# Patient Record
Sex: FEMALE | Race: WHITE | NOT HISPANIC OR LATINO | Employment: OTHER | URBAN - METROPOLITAN AREA
[De-identification: names, ages, dates, MRNs, and addresses within clinical notes are randomized per-mention and may not be internally consistent; named-entity substitution may affect disease eponyms.]

---

## 2023-05-19 DIAGNOSIS — I10 PRIMARY HYPERTENSION: Primary | ICD-10-CM

## 2023-05-19 RX ORDER — LISINOPRIL 30 MG/1
30 TABLET ORAL DAILY
Qty: 30 TABLET | Refills: 1 | Status: SHIPPED | OUTPATIENT
Start: 2023-05-19 | End: 2023-06-22 | Stop reason: SDUPTHER

## 2023-05-19 RX ORDER — LISINOPRIL 10 MG/1
TABLET ORAL
COMMUNITY
Start: 2020-06-23 | End: 2023-05-19 | Stop reason: SDUPTHER

## 2023-06-21 PROBLEM — M25.50 POLYARTHRALGIA: Status: ACTIVE | Noted: 2023-06-21

## 2023-06-21 PROBLEM — M62.830 BACK MUSCLE SPASM: Status: ACTIVE | Noted: 2023-06-21

## 2023-06-21 PROBLEM — M19.012 OSTEOARTHRITIS OF LEFT GLENOHUMERAL JOINT: Status: ACTIVE | Noted: 2023-06-21

## 2023-06-21 PROBLEM — B35.0 TINEA CAPITIS: Status: ACTIVE | Noted: 2023-06-21

## 2023-06-21 PROBLEM — G89.29 CHRONIC PAIN: Status: ACTIVE | Noted: 2023-06-21

## 2023-06-21 PROBLEM — M54.50 LUMBAGO: Status: ACTIVE | Noted: 2023-06-21

## 2023-06-21 PROBLEM — M79.18 MYOFASCIAL PAIN: Status: ACTIVE | Noted: 2023-06-21

## 2023-06-21 PROBLEM — K21.9 GERD (GASTROESOPHAGEAL REFLUX DISEASE): Status: ACTIVE | Noted: 2023-06-21

## 2023-06-21 PROBLEM — R73.9 HYPERGLYCEMIA: Status: ACTIVE | Noted: 2023-06-21

## 2023-06-21 PROBLEM — R19.7 DIARRHEA: Status: RESOLVED | Noted: 2023-06-21 | Resolved: 2023-06-21

## 2023-06-21 PROBLEM — E78.5 HYPERLIPIDEMIA: Status: ACTIVE | Noted: 2023-06-21

## 2023-06-21 PROBLEM — L25.9 CONTACT DERMATITIS: Status: RESOLVED | Noted: 2023-06-21 | Resolved: 2023-06-21

## 2023-06-21 PROBLEM — J44.9 CHRONIC OBSTRUCTIVE PULMONARY DISEASE (MULTI): Status: ACTIVE | Noted: 2023-06-21

## 2023-06-21 PROBLEM — F41.8 ANXIETY ASSOCIATED WITH DEPRESSION: Status: ACTIVE | Noted: 2023-06-21

## 2023-06-21 PROBLEM — F43.21 GRIEF: Status: RESOLVED | Noted: 2023-06-21 | Resolved: 2023-06-21

## 2023-06-21 PROBLEM — F31.9 BIPOLAR DEPRESSION (MULTI): Status: ACTIVE | Noted: 2023-06-21

## 2023-06-21 PROBLEM — E53.8 VITAMIN B12 DEFICIENCY: Status: ACTIVE | Noted: 2023-06-21

## 2023-06-21 PROBLEM — I10 ESSENTIAL HYPERTENSION: Status: ACTIVE | Noted: 2023-06-21

## 2023-06-21 PROBLEM — M79.7 FIBROMYALGIA: Status: ACTIVE | Noted: 2023-06-21

## 2023-06-21 PROBLEM — E55.9 VITAMIN D DEFICIENCY: Status: ACTIVE | Noted: 2023-06-21

## 2023-06-21 PROBLEM — Q63.9 KIDNEY ANOMALY, CONGENITAL: Status: ACTIVE | Noted: 2023-06-21

## 2023-06-21 RX ORDER — DICYCLOMINE HYDROCHLORIDE 10 MG/1
10 CAPSULE ORAL 4 TIMES DAILY
COMMUNITY
Start: 2022-01-26 | End: 2023-12-21 | Stop reason: ALTCHOICE

## 2023-06-21 RX ORDER — AMOXICILLIN 500 MG
CAPSULE ORAL
COMMUNITY

## 2023-06-21 RX ORDER — MULTIVIT WITH MINERALS/HERBS
1 TABLET ORAL DAILY
COMMUNITY

## 2023-06-21 RX ORDER — ESCITALOPRAM OXALATE 20 MG/1
20 TABLET ORAL DAILY
COMMUNITY

## 2023-06-21 RX ORDER — HYDROXYZINE HYDROCHLORIDE 50 MG/1
50-100 TABLET, FILM COATED ORAL NIGHTLY PRN
COMMUNITY
Start: 2023-04-26

## 2023-06-21 RX ORDER — CALCIUM CARB/VITAMIN D3/VIT K1 500-500-40
1 TABLET,CHEWABLE ORAL DAILY
COMMUNITY

## 2023-06-21 RX ORDER — KETOCONAZOLE 20 MG/ML
SHAMPOO, SUSPENSION TOPICAL
COMMUNITY

## 2023-06-21 RX ORDER — LOPERAMIDE HYDROCHLORIDE 2 MG/1
2 CAPSULE ORAL 4 TIMES DAILY PRN
COMMUNITY
Start: 2022-01-26

## 2023-06-21 RX ORDER — CLOBETASOL PROPIONATE 0.5 MG/G
CREAM TOPICAL
COMMUNITY

## 2023-06-21 RX ORDER — ACETAMINOPHEN 500 MG
1 TABLET ORAL 2 TIMES DAILY
COMMUNITY

## 2023-06-21 RX ORDER — MULTIVITAMIN
1 TABLET ORAL DAILY
COMMUNITY

## 2023-06-21 RX ORDER — FERROUS SULFATE 325(65) MG
65 TABLET ORAL
COMMUNITY

## 2023-06-21 RX ORDER — ATORVASTATIN CALCIUM 40 MG/1
1 TABLET, FILM COATED ORAL NIGHTLY
COMMUNITY
End: 2023-06-22 | Stop reason: SDUPTHER

## 2023-06-21 RX ORDER — OMEPRAZOLE 20 MG/1
20 CAPSULE, DELAYED RELEASE ORAL NIGHTLY
COMMUNITY
End: 2023-06-22 | Stop reason: SDUPTHER

## 2023-06-22 ENCOUNTER — OFFICE VISIT (OUTPATIENT)
Dept: PRIMARY CARE | Facility: CLINIC | Age: 68
End: 2023-06-22
Payer: COMMERCIAL

## 2023-06-22 VITALS
OXYGEN SATURATION: 97 % | BODY MASS INDEX: 29.63 KG/M2 | HEIGHT: 62 IN | WEIGHT: 161 LBS | HEART RATE: 68 BPM | RESPIRATION RATE: 16 BRPM | SYSTOLIC BLOOD PRESSURE: 118 MMHG | DIASTOLIC BLOOD PRESSURE: 68 MMHG

## 2023-06-22 DIAGNOSIS — M54.50 LUMBAR PAIN: ICD-10-CM

## 2023-06-22 DIAGNOSIS — E55.9 VITAMIN D DEFICIENCY: ICD-10-CM

## 2023-06-22 DIAGNOSIS — Z12.31 ENCOUNTER FOR SCREENING MAMMOGRAM FOR MALIGNANT NEOPLASM OF BREAST: ICD-10-CM

## 2023-06-22 DIAGNOSIS — E78.2 MIXED HYPERLIPIDEMIA: Primary | ICD-10-CM

## 2023-06-22 DIAGNOSIS — R73.01 ELEVATED FASTING BLOOD SUGAR: ICD-10-CM

## 2023-06-22 DIAGNOSIS — I10 PRIMARY HYPERTENSION: ICD-10-CM

## 2023-06-22 DIAGNOSIS — K21.9 GASTROESOPHAGEAL REFLUX DISEASE WITHOUT ESOPHAGITIS: ICD-10-CM

## 2023-06-22 DIAGNOSIS — E53.8 VITAMIN B12 DEFICIENCY: ICD-10-CM

## 2023-06-22 DIAGNOSIS — Z77.22 SECOND HAND SMOKE EXPOSURE: ICD-10-CM

## 2023-06-22 DIAGNOSIS — M54.2 NECK PAIN: ICD-10-CM

## 2023-06-22 DIAGNOSIS — Z13.29 SCREENING FOR HYPOTHYROIDISM: ICD-10-CM

## 2023-06-22 PROCEDURE — 3078F DIAST BP <80 MM HG: CPT

## 2023-06-22 PROCEDURE — 1036F TOBACCO NON-USER: CPT

## 2023-06-22 PROCEDURE — 99214 OFFICE O/P EST MOD 30 MIN: CPT

## 2023-06-22 PROCEDURE — 1159F MED LIST DOCD IN RCRD: CPT

## 2023-06-22 PROCEDURE — 3074F SYST BP LT 130 MM HG: CPT

## 2023-06-22 PROCEDURE — 1160F RVW MEDS BY RX/DR IN RCRD: CPT

## 2023-06-22 RX ORDER — ATORVASTATIN CALCIUM 40 MG/1
40 TABLET, FILM COATED ORAL NIGHTLY
Qty: 90 TABLET | Refills: 2 | Status: SHIPPED | OUTPATIENT
Start: 2023-06-22 | End: 2023-11-14 | Stop reason: SDUPTHER

## 2023-06-22 RX ORDER — LISINOPRIL 30 MG/1
30 TABLET ORAL DAILY
Qty: 30 TABLET | Refills: 1 | Status: SHIPPED | OUTPATIENT
Start: 2023-06-22 | End: 2023-11-14 | Stop reason: SDUPTHER

## 2023-06-22 RX ORDER — OMEPRAZOLE 20 MG/1
20 CAPSULE, DELAYED RELEASE ORAL NIGHTLY
Qty: 90 CAPSULE | Refills: 2 | Status: SHIPPED | OUTPATIENT
Start: 2023-06-22 | End: 2023-11-14 | Stop reason: SDUPTHER

## 2023-06-22 RX ORDER — CYCLOBENZAPRINE HCL 10 MG
10 TABLET ORAL NIGHTLY
Qty: 30 TABLET | Refills: 0 | Status: SHIPPED | OUTPATIENT
Start: 2023-06-22 | End: 2023-08-04

## 2023-06-22 ASSESSMENT — ENCOUNTER SYMPTOMS
GASTROINTESTINAL NEGATIVE: 1
ENDOCRINE NEGATIVE: 1
LOSS OF SENSATION IN FEET: 0
CARDIOVASCULAR NEGATIVE: 1
NEUROLOGICAL NEGATIVE: 1
OCCASIONAL FEELINGS OF UNSTEADINESS: 0
HEMATOLOGIC/LYMPHATIC NEGATIVE: 1
RESPIRATORY NEGATIVE: 1
EYES NEGATIVE: 1
DEPRESSION: 0
PSYCHIATRIC NEGATIVE: 1
CONSTITUTIONAL NEGATIVE: 1
ALLERGIC/IMMUNOLOGIC NEGATIVE: 1
MUSCULOSKELETAL NEGATIVE: 1

## 2023-06-22 ASSESSMENT — ANXIETY QUESTIONNAIRES
3. WORRYING TOO MUCH ABOUT DIFFERENT THINGS: NOT AT ALL
GAD7 TOTAL SCORE: 0
5. BEING SO RESTLESS THAT IT IS HARD TO SIT STILL: NOT AT ALL
7. FEELING AFRAID AS IF SOMETHING AWFUL MIGHT HAPPEN: NOT AT ALL
4. TROUBLE RELAXING: NOT AT ALL
1. FEELING NERVOUS, ANXIOUS, OR ON EDGE: NOT AT ALL
IF YOU CHECKED OFF ANY PROBLEMS ON THIS QUESTIONNAIRE, HOW DIFFICULT HAVE THESE PROBLEMS MADE IT FOR YOU TO DO YOUR WORK, TAKE CARE OF THINGS AT HOME, OR GET ALONG WITH OTHER PEOPLE: NOT DIFFICULT AT ALL
6. BECOMING EASILY ANNOYED OR IRRITABLE: NOT AT ALL
2. NOT BEING ABLE TO STOP OR CONTROL WORRYING: NOT AT ALL

## 2023-06-22 ASSESSMENT — PATIENT HEALTH QUESTIONNAIRE - PHQ9
1. LITTLE INTEREST OR PLEASURE IN DOING THINGS: NOT AT ALL
2. FEELING DOWN, DEPRESSED OR HOPELESS: NOT AT ALL
SUM OF ALL RESPONSES TO PHQ9 QUESTIONS 1 AND 2: 0

## 2023-06-22 NOTE — PROGRESS NOTES
"Subjective   Patient ID: Renu Patel is a 67 y.o. female who presents for Follow-up.    HPI a 67-year-old female with past medical history of hyperlipidemia, anxiety and depression, hypertension, GERD, vitamin D deficiency, chronic neck and lumbar sacral pain, IBS, iron deficiency, vitamin deficiency arrives to clinic with chief complaint of 6-month follow-up.  At her last appointment, she was given directions to complete a CT cardiac scoring.  She was not given any orders for blood work because her blood work at her last appointment was unremarkable.  She reports no recent illnesses and has been relatively healthy.  She does report that her chronic neck and lower back pain has worsened and is wondering if there are any interventions for this.  She denies any falls or trauma.  She is here for further evaluation and health maintenance.    Review of Systems   Constitutional: Negative.    HENT: Negative.     Eyes: Negative.    Respiratory: Negative.     Cardiovascular: Negative.    Gastrointestinal: Negative.    Endocrine: Negative.    Genitourinary: Negative.    Musculoskeletal: Negative.    Skin: Negative.    Allergic/Immunologic: Negative.    Neurological: Negative.    Hematological: Negative.    Psychiatric/Behavioral: Negative.     All other systems reviewed and are negative.      Objective   /68   Pulse 68   Resp 16   Ht 1.575 m (5' 2\")   Wt 73 kg (161 lb)   SpO2 97%   BMI 29.45 kg/m²     Physical Exam  Vitals and nursing note reviewed.   Constitutional:       Appearance: Normal appearance.   HENT:      Head: Normocephalic and atraumatic.      Right Ear: Tympanic membrane normal.      Left Ear: Tympanic membrane normal.      Nose: Nose normal.      Mouth/Throat:      Mouth: Mucous membranes are moist.      Pharynx: Oropharynx is clear.   Eyes:      Extraocular Movements: Extraocular movements intact.      Conjunctiva/sclera: Conjunctivae normal.      Pupils: Pupils are equal, round, and reactive " to light.   Cardiovascular:      Rate and Rhythm: Normal rate and regular rhythm.   Pulmonary:      Effort: Pulmonary effort is normal.      Breath sounds: Normal breath sounds.   Abdominal:      General: Bowel sounds are normal.      Palpations: Abdomen is soft.   Musculoskeletal:         General: Normal range of motion.      Cervical back: Normal range of motion and neck supple.   Skin:     General: Skin is warm.      Capillary Refill: Capillary refill takes less than 2 seconds.   Neurological:      General: No focal deficit present.      Mental Status: She is alert and oriented to person, place, and time. Mental status is at baseline.   Psychiatric:         Mood and Affect: Mood normal.         Behavior: Behavior normal.         Thought Content: Thought content normal.         Judgment: Judgment normal.         Assessment/Plan   Problem List Items Addressed This Visit       GERD (gastroesophageal reflux disease)    Relevant Medications    omeprazole (PriLOSEC) 20 mg DR capsule    Other Relevant Orders    Comprehensive Metabolic Panel    Hyperlipidemia - Primary    Relevant Medications    atorvastatin (Lipitor) 40 mg tablet    Other Relevant Orders    Lipid Panel    Vitamin B12 deficiency    Relevant Orders    CBC    Vitamin B12    Vitamin D deficiency    Relevant Orders    Vitamin D, Total     Other Visit Diagnoses       Primary hypertension        Relevant Medications    lisinopril 30 mg tablet    Screening for hypothyroidism        Relevant Orders    TSH with reflex to Free T4 if abnormal    Elevated fasting blood sugar        Relevant Orders    Hemoglobin A1C    Encounter for screening mammogram for malignant neoplasm of breast        Relevant Orders    BI mammo bilateral screening tomosynthesis    Lumbar pain        Relevant Medications    cyclobenzaprine (Flexeril) 10 mg tablet    Other Relevant Orders    XR lumbar spine 2-3 views    Neck pain        Relevant Orders    XR cervical spine 2-3 views    Second  hand smoke exposure        Relevant Orders    XR chest 2 views          It was a pleasure seeing you in the office today.    I have ordered blood work for you to complete prior to your next appointment.  These labs require you to fast.  Your next appointment will be a 6-month Medicare wellness.    You are up-to-date on all screening exams and vaccinations other than your mammogram.  I have placed a order for a mammogram for you to complete before your next appointment.    Please continue all of your medications as prescribed as they are working to control your symptoms.    In regards to your neck pain, I have ordered an x-ray of your neck.  I will call you with any osseous abnormalities.  I have also ordered an x-ray of your lumbar sacral spine to rule out any osseous abnormalities.  I have also ordered Flexeril 10 mg oral tablet nightly for your back pain.    You request this x-ray of your chest due to secondhand smoke exposure.  I have also placed this as well.    Please do not hesitate to call the office for any future questions or concerns.    I also advised you to follow low fat diet and exercise for at least 30 minutes daily.    Anticipatory guidance, age appropriate vaccines, screening exams, health promotion and prevention discussed.    This document was generated using the assistance of voice recognition software. If there are any errors of spelling, grammar, syntax, or meaning; please feel free to contact me directly for clarification.

## 2023-07-10 ENCOUNTER — LAB (OUTPATIENT)
Dept: LAB | Facility: LAB | Age: 68
End: 2023-07-10
Payer: COMMERCIAL

## 2023-07-10 DIAGNOSIS — R73.01 ELEVATED FASTING BLOOD SUGAR: ICD-10-CM

## 2023-07-10 DIAGNOSIS — K21.9 GASTROESOPHAGEAL REFLUX DISEASE WITHOUT ESOPHAGITIS: ICD-10-CM

## 2023-07-10 DIAGNOSIS — E78.2 MIXED HYPERLIPIDEMIA: ICD-10-CM

## 2023-07-10 DIAGNOSIS — E53.8 VITAMIN B12 DEFICIENCY: ICD-10-CM

## 2023-07-10 DIAGNOSIS — E55.9 VITAMIN D DEFICIENCY: ICD-10-CM

## 2023-07-10 DIAGNOSIS — Z13.29 SCREENING FOR HYPOTHYROIDISM: ICD-10-CM

## 2023-07-10 LAB
ALANINE AMINOTRANSFERASE (SGPT) (U/L) IN SER/PLAS: 13 U/L (ref 7–45)
ALBUMIN (G/DL) IN SER/PLAS: 4.1 G/DL (ref 3.4–5)
ALKALINE PHOSPHATASE (U/L) IN SER/PLAS: 89 U/L (ref 33–136)
ANION GAP IN SER/PLAS: 16 MMOL/L (ref 10–20)
ASPARTATE AMINOTRANSFERASE (SGOT) (U/L) IN SER/PLAS: 17 U/L (ref 9–39)
BILIRUBIN TOTAL (MG/DL) IN SER/PLAS: 0.6 MG/DL (ref 0–1.2)
CALCIDIOL (25 OH VITAMIN D3) (NG/ML) IN SER/PLAS: 50 NG/ML
CALCIUM (MG/DL) IN SER/PLAS: 8.9 MG/DL (ref 8.6–10.3)
CARBON DIOXIDE, TOTAL (MMOL/L) IN SER/PLAS: 22 MMOL/L (ref 21–32)
CHLORIDE (MMOL/L) IN SER/PLAS: 105 MMOL/L (ref 98–107)
CHOLESTEROL (MG/DL) IN SER/PLAS: 165 MG/DL (ref 0–199)
CHOLESTEROL IN HDL (MG/DL) IN SER/PLAS: 44.2 MG/DL
CHOLESTEROL/HDL RATIO: 3.7
COBALAMIN (VITAMIN B12) (PG/ML) IN SER/PLAS: 1097 PG/ML (ref 211–911)
CREATININE (MG/DL) IN SER/PLAS: 0.81 MG/DL (ref 0.5–1.05)
ERYTHROCYTE DISTRIBUTION WIDTH (RATIO) BY AUTOMATED COUNT: 12.7 % (ref 11.5–14.5)
ERYTHROCYTE MEAN CORPUSCULAR HEMOGLOBIN CONCENTRATION (G/DL) BY AUTOMATED: 33.6 G/DL (ref 32–36)
ERYTHROCYTE MEAN CORPUSCULAR VOLUME (FL) BY AUTOMATED COUNT: 89 FL (ref 80–100)
ERYTHROCYTES (10*6/UL) IN BLOOD BY AUTOMATED COUNT: 4.56 X10E12/L (ref 4–5.2)
ESTIMATED AVERAGE GLUCOSE FOR HBA1C: 117 MG/DL
GFR FEMALE: 80 ML/MIN/1.73M2
GLUCOSE (MG/DL) IN SER/PLAS: 80 MG/DL (ref 74–99)
HEMATOCRIT (%) IN BLOOD BY AUTOMATED COUNT: 40.5 % (ref 36–46)
HEMOGLOBIN (G/DL) IN BLOOD: 13.6 G/DL (ref 12–16)
HEMOGLOBIN A1C/HEMOGLOBIN TOTAL IN BLOOD: 5.7 %
LDL: 90 MG/DL (ref 0–99)
LEUKOCYTES (10*3/UL) IN BLOOD BY AUTOMATED COUNT: 6.3 X10E9/L (ref 4.4–11.3)
PLATELETS (10*3/UL) IN BLOOD AUTOMATED COUNT: 195 X10E9/L (ref 150–450)
POTASSIUM (MMOL/L) IN SER/PLAS: 4.1 MMOL/L (ref 3.5–5.3)
PROTEIN TOTAL: 6.1 G/DL (ref 6.4–8.2)
SODIUM (MMOL/L) IN SER/PLAS: 139 MMOL/L (ref 136–145)
THYROTROPIN (MIU/L) IN SER/PLAS BY DETECTION LIMIT <= 0.05 MIU/L: 1.71 MIU/L (ref 0.44–3.98)
TRIGLYCERIDE (MG/DL) IN SER/PLAS: 155 MG/DL (ref 0–149)
UREA NITROGEN (MG/DL) IN SER/PLAS: 12 MG/DL (ref 6–23)
VLDL: 31 MG/DL (ref 0–40)

## 2023-07-10 PROCEDURE — 84443 ASSAY THYROID STIM HORMONE: CPT

## 2023-07-10 PROCEDURE — 80061 LIPID PANEL: CPT

## 2023-07-10 PROCEDURE — 80053 COMPREHEN METABOLIC PANEL: CPT

## 2023-07-10 PROCEDURE — 82607 VITAMIN B-12: CPT

## 2023-07-10 PROCEDURE — 85027 COMPLETE CBC AUTOMATED: CPT

## 2023-07-10 PROCEDURE — 83036 HEMOGLOBIN GLYCOSYLATED A1C: CPT

## 2023-07-10 PROCEDURE — 36415 COLL VENOUS BLD VENIPUNCTURE: CPT

## 2023-07-10 PROCEDURE — 82306 VITAMIN D 25 HYDROXY: CPT

## 2023-08-04 DIAGNOSIS — M54.50 LUMBAR PAIN: ICD-10-CM

## 2023-08-04 RX ORDER — CYCLOBENZAPRINE HCL 10 MG
TABLET ORAL
Qty: 30 TABLET | Refills: 0 | Status: SHIPPED | OUTPATIENT
Start: 2023-08-04 | End: 2023-11-14 | Stop reason: SDUPTHER

## 2023-11-14 ENCOUNTER — TELEPHONE (OUTPATIENT)
Dept: PRIMARY CARE | Facility: CLINIC | Age: 68
End: 2023-11-14
Payer: COMMERCIAL

## 2023-11-14 DIAGNOSIS — K21.9 GASTROESOPHAGEAL REFLUX DISEASE WITHOUT ESOPHAGITIS: ICD-10-CM

## 2023-11-14 DIAGNOSIS — M54.50 LUMBAR PAIN: ICD-10-CM

## 2023-11-14 DIAGNOSIS — I10 PRIMARY HYPERTENSION: ICD-10-CM

## 2023-11-14 DIAGNOSIS — E78.2 MIXED HYPERLIPIDEMIA: ICD-10-CM

## 2023-11-14 RX ORDER — CYCLOBENZAPRINE HCL 10 MG
10 TABLET ORAL DAILY
Qty: 30 TABLET | Refills: 0 | Status: SHIPPED | OUTPATIENT
Start: 2023-11-14 | End: 2023-12-18 | Stop reason: SDUPTHER

## 2023-11-14 RX ORDER — ATORVASTATIN CALCIUM 40 MG/1
40 TABLET, FILM COATED ORAL NIGHTLY
Qty: 30 TABLET | Refills: 0 | Status: SHIPPED | OUTPATIENT
Start: 2023-11-14 | End: 2023-12-18 | Stop reason: SDUPTHER

## 2023-11-14 RX ORDER — LISINOPRIL 30 MG/1
30 TABLET ORAL DAILY
Qty: 30 TABLET | Refills: 0 | Status: SHIPPED | OUTPATIENT
Start: 2023-11-14 | End: 2023-12-18 | Stop reason: SDUPTHER

## 2023-11-14 RX ORDER — OMEPRAZOLE 20 MG/1
20 CAPSULE, DELAYED RELEASE ORAL NIGHTLY
Qty: 30 CAPSULE | Refills: 0 | Status: SHIPPED | OUTPATIENT
Start: 2023-11-14 | End: 2023-12-18 | Stop reason: SDUPTHER

## 2023-11-14 NOTE — TELEPHONE ENCOUNTER
Refill on Atorvastatin 40 mg , Omeprazole 20 mg , Lisinopril 30 mg    & Cyclobenzaprine 10 mg    To Theron Markham

## 2023-12-15 ENCOUNTER — TELEPHONE (OUTPATIENT)
Dept: PRIMARY CARE | Facility: CLINIC | Age: 68
End: 2023-12-15
Payer: COMMERCIAL

## 2023-12-15 DIAGNOSIS — I10 PRIMARY HYPERTENSION: ICD-10-CM

## 2023-12-15 DIAGNOSIS — K21.9 GASTROESOPHAGEAL REFLUX DISEASE WITHOUT ESOPHAGITIS: ICD-10-CM

## 2023-12-15 DIAGNOSIS — E78.2 MIXED HYPERLIPIDEMIA: ICD-10-CM

## 2023-12-15 DIAGNOSIS — M54.50 LUMBAR PAIN: ICD-10-CM

## 2023-12-15 NOTE — TELEPHONE ENCOUNTER
Atorvastatin  Lisinopril   Cyclobenzaprine   Omeprazole   [facility] [place]   [employer]   LOV 6/22/2023  NOV 12/21/23

## 2023-12-18 RX ORDER — OMEPRAZOLE 20 MG/1
20 CAPSULE, DELAYED RELEASE ORAL NIGHTLY
Qty: 90 CAPSULE | Refills: 1 | Status: SHIPPED | OUTPATIENT
Start: 2023-12-18 | End: 2024-06-15

## 2023-12-18 RX ORDER — LISINOPRIL 30 MG/1
30 TABLET ORAL DAILY
Qty: 90 TABLET | Refills: 1 | Status: SHIPPED | OUTPATIENT
Start: 2023-12-18 | End: 2024-06-15

## 2023-12-18 RX ORDER — ATORVASTATIN CALCIUM 40 MG/1
40 TABLET, FILM COATED ORAL NIGHTLY
Qty: 90 TABLET | Refills: 1 | Status: SHIPPED | OUTPATIENT
Start: 2023-12-18 | End: 2024-06-15

## 2023-12-18 RX ORDER — CYCLOBENZAPRINE HCL 10 MG
10 TABLET ORAL DAILY
Qty: 90 TABLET | Refills: 0 | Status: SHIPPED | OUTPATIENT
Start: 2023-12-18 | End: 2024-03-17

## 2023-12-21 ENCOUNTER — OFFICE VISIT (OUTPATIENT)
Dept: PRIMARY CARE | Facility: CLINIC | Age: 68
End: 2023-12-21
Payer: COMMERCIAL

## 2023-12-21 VITALS
HEART RATE: 84 BPM | SYSTOLIC BLOOD PRESSURE: 130 MMHG | WEIGHT: 166 LBS | DIASTOLIC BLOOD PRESSURE: 84 MMHG | HEIGHT: 62 IN | BODY MASS INDEX: 30.55 KG/M2

## 2023-12-21 DIAGNOSIS — R73.03 PREDIABETES: ICD-10-CM

## 2023-12-21 DIAGNOSIS — E55.9 VITAMIN D DEFICIENCY: ICD-10-CM

## 2023-12-21 DIAGNOSIS — E53.8 VITAMIN B12 DEFICIENCY: ICD-10-CM

## 2023-12-21 DIAGNOSIS — F31.9 BIPOLAR DEPRESSION (MULTI): ICD-10-CM

## 2023-12-21 DIAGNOSIS — Z12.11 COLON CANCER SCREENING: ICD-10-CM

## 2023-12-21 DIAGNOSIS — Z12.31 VISIT FOR SCREENING MAMMOGRAM: ICD-10-CM

## 2023-12-21 DIAGNOSIS — J44.9 CHRONIC OBSTRUCTIVE PULMONARY DISEASE, UNSPECIFIED COPD TYPE (MULTI): ICD-10-CM

## 2023-12-21 DIAGNOSIS — R73.9 HYPERGLYCEMIA: ICD-10-CM

## 2023-12-21 DIAGNOSIS — I10 ESSENTIAL HYPERTENSION: ICD-10-CM

## 2023-12-21 DIAGNOSIS — E78.2 MIXED HYPERLIPIDEMIA: ICD-10-CM

## 2023-12-21 DIAGNOSIS — Z00.00 MEDICARE ANNUAL WELLNESS VISIT, SUBSEQUENT: Primary | ICD-10-CM

## 2023-12-21 PROCEDURE — G0444 DEPRESSION SCREEN ANNUAL: HCPCS | Performed by: CLINICAL NURSE SPECIALIST

## 2023-12-21 PROCEDURE — 3075F SYST BP GE 130 - 139MM HG: CPT | Performed by: CLINICAL NURSE SPECIALIST

## 2023-12-21 PROCEDURE — G0439 PPPS, SUBSEQ VISIT: HCPCS | Performed by: CLINICAL NURSE SPECIALIST

## 2023-12-21 PROCEDURE — 3079F DIAST BP 80-89 MM HG: CPT | Performed by: CLINICAL NURSE SPECIALIST

## 2023-12-21 PROCEDURE — 1170F FXNL STATUS ASSESSED: CPT | Performed by: CLINICAL NURSE SPECIALIST

## 2023-12-21 PROCEDURE — 99214 OFFICE O/P EST MOD 30 MIN: CPT | Performed by: CLINICAL NURSE SPECIALIST

## 2023-12-21 PROCEDURE — G0009 ADMIN PNEUMOCOCCAL VACCINE: HCPCS | Performed by: CLINICAL NURSE SPECIALIST

## 2023-12-21 PROCEDURE — 90686 IIV4 VACC NO PRSV 0.5 ML IM: CPT | Performed by: CLINICAL NURSE SPECIALIST

## 2023-12-21 PROCEDURE — 1036F TOBACCO NON-USER: CPT | Performed by: CLINICAL NURSE SPECIALIST

## 2023-12-21 PROCEDURE — 90677 PCV20 VACCINE IM: CPT | Performed by: CLINICAL NURSE SPECIALIST

## 2023-12-21 PROCEDURE — G0008 ADMIN INFLUENZA VIRUS VAC: HCPCS | Performed by: CLINICAL NURSE SPECIALIST

## 2023-12-21 PROCEDURE — 1160F RVW MEDS BY RX/DR IN RCRD: CPT | Performed by: CLINICAL NURSE SPECIALIST

## 2023-12-21 PROCEDURE — 1159F MED LIST DOCD IN RCRD: CPT | Performed by: CLINICAL NURSE SPECIALIST

## 2023-12-21 SDOH — ECONOMIC STABILITY: FOOD INSECURITY: WITHIN THE PAST 12 MONTHS, THE FOOD YOU BOUGHT JUST DIDN'T LAST AND YOU DIDN'T HAVE MONEY TO GET MORE.: SOMETIMES TRUE

## 2023-12-21 SDOH — ECONOMIC STABILITY: FOOD INSECURITY: WITHIN THE PAST 12 MONTHS, YOU WORRIED THAT YOUR FOOD WOULD RUN OUT BEFORE YOU GOT MONEY TO BUY MORE.: SOMETIMES TRUE

## 2023-12-21 ASSESSMENT — COLUMBIA-SUICIDE SEVERITY RATING SCALE - C-SSRS
6. HAVE YOU EVER DONE ANYTHING, STARTED TO DO ANYTHING, OR PREPARED TO DO ANYTHING TO END YOUR LIFE?: NO
2. HAVE YOU ACTUALLY HAD ANY THOUGHTS OF KILLING YOURSELF?: NO
1. IN THE PAST MONTH, HAVE YOU WISHED YOU WERE DEAD OR WISHED YOU COULD GO TO SLEEP AND NOT WAKE UP?: NO

## 2023-12-21 ASSESSMENT — ENCOUNTER SYMPTOMS
MYALGIAS: 0
APPETITE CHANGE: 0
PALPITATIONS: 0
UNEXPECTED WEIGHT CHANGE: 0
POLYDIPSIA: 0
LOSS OF SENSATION IN FEET: 0
FATIGUE: 0
DIZZINESS: 0
SEIZURES: 0
BRUISES/BLEEDS EASILY: 0
PHOTOPHOBIA: 0
NAUSEA: 0
DYSURIA: 0
SLEEP DISTURBANCE: 0
CHILLS: 0
BACK PAIN: 0
SORE THROAT: 0
DIARRHEA: 0
ACTIVITY CHANGE: 0
FLANK PAIN: 0
DEPRESSION: 0
VOMITING: 0
CHEST TIGHTNESS: 0
OCCASIONAL FEELINGS OF UNSTEADINESS: 0
ABDOMINAL PAIN: 0
BLOOD IN STOOL: 0
WHEEZING: 0
CONSTIPATION: 0
CONFUSION: 0
COUGH: 0
ARTHRALGIAS: 0
FEVER: 0
WOUND: 0
SHORTNESS OF BREATH: 0
HEADACHES: 0
JOINT SWELLING: 0
TROUBLE SWALLOWING: 0
NECK PAIN: 0
HEMATURIA: 0
EYE PAIN: 0

## 2023-12-21 ASSESSMENT — ACTIVITIES OF DAILY LIVING (ADL)
MANAGING_FINANCES: INDEPENDENT
BATHING: INDEPENDENT
BATHING: INDEPENDENT
GROCERY_SHOPPING: INDEPENDENT
TAKING_MEDICATION: INDEPENDENT
DOING_HOUSEWORK: INDEPENDENT
DRESSING: INDEPENDENT
DRESSING: INDEPENDENT

## 2023-12-21 NOTE — PROGRESS NOTES
Subjective   Reason for Visit: Renu Patel is an 68 y.o. female here for a Medicare Wellness visit.     Past Medical, Surgical, and Family History reviewed and updated in chart.    Reviewed all medications by prescribing practitioner or clinical pharmacist (such as prescriptions, OTCs, herbal therapies and supplements) and documented in the medical record.    HPI    New patient here today to establish care.  Transfer care from [facility] for Medicare Wellness.     Follows with Dr. Marion White. Has been on Lexapro. Bipolar Depression and PTSD. Hydroxyzine PRN.     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 Previously followed with Dr. Cochran.     Has continued medications as prescribed.         Patient Care Team:  [facility] as PCP - General (Internal Medicine)  Fei Almonte MD as PCP - [facility] PCP  Stephen Schultz MD (Psychiatry)     Review of Systems   Constitutional:  Negative for activity change, appetite change, chills, fatigue, fever and unexpected weight change.   HENT:  Negative for ear pain, hearing loss, nosebleeds, sore throat, tinnitus and trouble swallowing.    Eyes:  Negative for photophobia, pain and visual disturbance.   Respiratory:  Negative for cough, chest tightness, shortness of breath and wheezing.    Cardiovascular:  Negative for chest pain, palpitations and leg swelling.   Gastrointestinal:  Negative for abdominal pain, blood in stool, constipation, diarrhea, nausea and vomiting.   Endocrine: Negative for cold intolerance, heat intolerance, polydipsia and polyuria.   Genitourinary:  Negative for dysuria, flank pain and hematuria.   Musculoskeletal:  Negative for arthralgias, back pain, joint swelling, myalgias and neck pain.   Skin:  Negative for pallor, rash and wound.   Allergic/Immunologic: Negative for immunocompromised state.   Neurological:  Negative for dizziness, seizures and headaches.   Hematological:  Does not bruise/bleed easily.  "  Psychiatric/Behavioral:  Negative for confusion and sleep disturbance.        Objective   Vitals:  /84   Pulse 84   Ht 1.575 m (5' 2\")   Wt 75.3 kg (166 lb)   BMI 30.36 kg/m²       Physical Exam  Vitals and nursing note reviewed.   Constitutional:       General: She is not in acute distress.     Appearance: Normal appearance.   HENT:      Head: Normocephalic.      Nose: Nose normal.   Eyes:      Conjunctiva/sclera: Conjunctivae normal.   Neck:      Vascular: No carotid bruit.   Cardiovascular:      Rate and Rhythm: Normal rate and regular rhythm.      Pulses: Normal pulses.      Heart sounds: Normal heart sounds.   Pulmonary:      Effort: Pulmonary effort is normal.      Breath sounds: Normal breath sounds.   Abdominal:      General: Bowel sounds are normal.      Palpations: Abdomen is soft.   Musculoskeletal:         General: Normal range of motion.      Cervical back: Normal range of motion.   Skin:     General: Skin is warm and dry.   Neurological:      Mental Status: She is alert and oriented to person, place, and time. Mental status is at baseline.   Psychiatric:         Mood and Affect: Mood normal.         Behavior: Behavior normal.       Assessment/Plan   Problem List Items Addressed This Visit    None    New order for blood work provided at  today.     Medicare Wellness: Routine and age appropriate recommendations discussed with the patient today and patient verbalized understanding of the recommendations.  Questions answered.  Age appropriate immunizations and preventative screenings discussed with the patient and ordered as appropriate. Labs updated and ordered as indicated. Recommend healthy diet and daily exercise to maintain healthy body weight.   Bipolar Depression, PTSD: Following with [facility] for management.   COPD: Respiratory status at baseline. Declines any need for Inhalers.   DDD: Previously followed with Chiropractor for management. Water Therapy.   Prediabetes: A1C 5.7%. " Lifestyle changes recommended: Diet consisting of low fat foods, lean meats, high fiber, fresh fruits and vegetables. 150 min/ weekly aerobic exercise.   Hyperlipidemia: Continue Atorvastatin. CT Cardiac Scoring December 2022, low risk. [facility] referral provided.   Hypertension: Continue Lisinopril. Blood pressure borderline at OV today. Encourage ambulatory blood pressure monitoring.   Vitamin D Deficiency: Vitamin D. Reevaluate with next lab work.   Vitamin B12 Deficiency: Vitamin B12. Reevaluate with next lab work.     Medicare Wellness: December 2023.   Bone Density: October 2022, Osteopenia.   Mammogram: April 2022. Ordered for 2023.   Colonoscopy ordered.   Pneumovax: July 2021.   Flu Vaccine: December 2023.   Prevnar 20: December 2023.   Declined updated Vaccinations.

## 2023-12-22 ENCOUNTER — APPOINTMENT (OUTPATIENT)
Dept: PRIMARY CARE | Facility: CLINIC | Age: 68
End: 2023-12-22
Payer: COMMERCIAL

## 2024-01-31 ENCOUNTER — TELEPHONE (OUTPATIENT)
Dept: GASTROENTEROLOGY | Facility: CLINIC | Age: 69
End: 2024-01-31
Payer: COMMERCIAL

## 2024-01-31 NOTE — TELEPHONE ENCOUNTER
----- Message from Yumiko Dumont MA sent at 1/31/2024  9:15 AM EST -----  Regarding: RE: colonoscopy screening  KEPT GETTING A BUSY SIGNAL.    LETTER MAILED TO THE PATIENT TO CONTACT THE OFFICE  ----- Message -----  From: Yumiko Dumont MA  Sent: 1/22/2024   1:49 PM EST  To: [id number]  Subject: RE: colonoscopy screening                        KEPT GETTING A BUSY SIGNAL. WILL TRY AGAIN LATER  ----- Message -----  From: Naomi Miller MA  Sent: 1/11/2024  11:10 AM EST  To: [id number]  Subject: colonoscopy screening                            Open access

## 2024-02-26 ENCOUNTER — HOSPITAL ENCOUNTER (EMERGENCY)
Facility: HOSPITAL | Age: 69
Discharge: HOME | End: 2024-02-26
Attending: EMERGENCY MEDICINE
Payer: COMMERCIAL

## 2024-02-26 ENCOUNTER — APPOINTMENT (OUTPATIENT)
Dept: RADIOLOGY | Facility: HOSPITAL | Age: 69
End: 2024-02-26
Payer: COMMERCIAL

## 2024-02-26 VITALS
TEMPERATURE: 98.5 F | RESPIRATION RATE: 18 BRPM | SYSTOLIC BLOOD PRESSURE: 148 MMHG | DIASTOLIC BLOOD PRESSURE: 68 MMHG | HEART RATE: 85 BPM | OXYGEN SATURATION: 98 % | WEIGHT: 160 LBS | HEIGHT: 62 IN | BODY MASS INDEX: 29.44 KG/M2

## 2024-02-26 DIAGNOSIS — L02.91 ABSCESS: Primary | ICD-10-CM

## 2024-02-26 LAB
ANION GAP SERPL CALC-SCNC: 10 MMOL/L (ref 10–20)
BASOPHILS # BLD AUTO: 0.04 X10*3/UL (ref 0–0.1)
BASOPHILS NFR BLD AUTO: 0.6 %
BUN SERPL-MCNC: 8 MG/DL (ref 6–23)
CALCIUM SERPL-MCNC: 9.4 MG/DL (ref 8.6–10.3)
CHLORIDE SERPL-SCNC: 103 MMOL/L (ref 98–107)
CO2 SERPL-SCNC: 27 MMOL/L (ref 21–32)
CREAT SERPL-MCNC: 0.79 MG/DL (ref 0.5–1.05)
EGFRCR SERPLBLD CKD-EPI 2021: 82 ML/MIN/1.73M*2
EOSINOPHIL # BLD AUTO: 0.25 X10*3/UL (ref 0–0.7)
EOSINOPHIL NFR BLD AUTO: 3.9 %
ERYTHROCYTE [DISTWIDTH] IN BLOOD BY AUTOMATED COUNT: 13 % (ref 11.5–14.5)
GLUCOSE SERPL-MCNC: 112 MG/DL (ref 74–99)
HCT VFR BLD AUTO: 37.9 % (ref 36–46)
HGB BLD-MCNC: 12.8 G/DL (ref 12–16)
IMM GRANULOCYTES # BLD AUTO: 0.02 X10*3/UL (ref 0–0.7)
IMM GRANULOCYTES NFR BLD AUTO: 0.3 % (ref 0–0.9)
LACTATE SERPL-SCNC: 1.2 MMOL/L (ref 0.4–2)
LYMPHOCYTES # BLD AUTO: 1.36 X10*3/UL (ref 1.2–4.8)
LYMPHOCYTES NFR BLD AUTO: 21 %
MCH RBC QN AUTO: 29.4 PG (ref 26–34)
MCHC RBC AUTO-ENTMCNC: 33.8 G/DL (ref 32–36)
MCV RBC AUTO: 87 FL (ref 80–100)
MONOCYTES # BLD AUTO: 0.48 X10*3/UL (ref 0.1–1)
MONOCYTES NFR BLD AUTO: 7.4 %
NEUTROPHILS # BLD AUTO: 4.34 X10*3/UL (ref 1.2–7.7)
NEUTROPHILS NFR BLD AUTO: 66.8 %
NRBC BLD-RTO: 0 /100 WBCS (ref 0–0)
PLATELET # BLD AUTO: 202 X10*3/UL (ref 150–450)
POTASSIUM SERPL-SCNC: 3.5 MMOL/L (ref 3.5–5.3)
RBC # BLD AUTO: 4.36 X10*6/UL (ref 4–5.2)
SODIUM SERPL-SCNC: 136 MMOL/L (ref 136–145)
WBC # BLD AUTO: 6.5 X10*3/UL (ref 4.4–11.3)

## 2024-02-26 PROCEDURE — 36415 COLL VENOUS BLD VENIPUNCTURE: CPT | Performed by: NURSE PRACTITIONER

## 2024-02-26 PROCEDURE — 85025 COMPLETE CBC W/AUTO DIFF WBC: CPT | Performed by: NURSE PRACTITIONER

## 2024-02-26 PROCEDURE — 2500000005 HC RX 250 GENERAL PHARMACY W/O HCPCS

## 2024-02-26 PROCEDURE — 74177 CT ABD & PELVIS W/CONTRAST: CPT | Mod: FOREIGN READ | Performed by: RADIOLOGY

## 2024-02-26 PROCEDURE — 83605 ASSAY OF LACTIC ACID: CPT | Performed by: NURSE PRACTITIONER

## 2024-02-26 PROCEDURE — 2550000001 HC RX 255 CONTRASTS: Performed by: NURSE PRACTITIONER

## 2024-02-26 PROCEDURE — 74177 CT ABD & PELVIS W/CONTRAST: CPT

## 2024-02-26 PROCEDURE — 99284 EMERGENCY DEPT VISIT MOD MDM: CPT | Mod: 25

## 2024-02-26 PROCEDURE — 46050 I&D PERIANAL ABSCESS SUPFC: CPT | Performed by: STUDENT IN AN ORGANIZED HEALTH CARE EDUCATION/TRAINING PROGRAM

## 2024-02-26 PROCEDURE — 80048 BASIC METABOLIC PNL TOTAL CA: CPT | Performed by: NURSE PRACTITIONER

## 2024-02-26 RX ORDER — LIDOCAINE HYDROCHLORIDE AND EPINEPHRINE 10; 10 MG/ML; UG/ML
10 INJECTION, SOLUTION INFILTRATION; PERINEURAL ONCE
Status: COMPLETED | OUTPATIENT
Start: 2024-02-26 | End: 2024-02-26

## 2024-02-26 RX ORDER — LIDOCAINE HYDROCHLORIDE AND EPINEPHRINE 10; 10 MG/ML; UG/ML
INJECTION, SOLUTION INFILTRATION; PERINEURAL
Status: COMPLETED
Start: 2024-02-26 | End: 2024-02-26

## 2024-02-26 RX ORDER — AMOXICILLIN AND CLAVULANATE POTASSIUM 875; 125 MG/1; MG/1
1 TABLET, FILM COATED ORAL EVERY 12 HOURS
Qty: 14 TABLET | Refills: 0 | Status: SHIPPED | OUTPATIENT
Start: 2024-02-26 | End: 2024-03-04

## 2024-02-26 RX ADMIN — IOHEXOL 75 ML: 350 INJECTION, SOLUTION INTRAVENOUS at 11:54

## 2024-02-26 RX ADMIN — LIDOCAINE HYDROCHLORIDE,EPINEPHRINE BITARTRATE 10 ML: 10; .01 INJECTION, SOLUTION INFILTRATION; PERINEURAL at 17:45

## 2024-02-26 RX ADMIN — LIDOCAINE HYDROCHLORIDE AND EPINEPHRINE 10 ML: 10; 10 INJECTION, SOLUTION INFILTRATION; PERINEURAL at 17:45

## 2024-02-26 ASSESSMENT — COLUMBIA-SUICIDE SEVERITY RATING SCALE - C-SSRS
1. IN THE PAST MONTH, HAVE YOU WISHED YOU WERE DEAD OR WISHED YOU COULD GO TO SLEEP AND NOT WAKE UP?: NO
6. HAVE YOU EVER DONE ANYTHING, STARTED TO DO ANYTHING, OR PREPARED TO DO ANYTHING TO END YOUR LIFE?: NO
2. HAVE YOU ACTUALLY HAD ANY THOUGHTS OF KILLING YOURSELF?: NO

## 2024-02-26 ASSESSMENT — PAIN - FUNCTIONAL ASSESSMENT
PAIN_FUNCTIONAL_ASSESSMENT: 0-10
PAIN_FUNCTIONAL_ASSESSMENT: 0-10

## 2024-02-26 ASSESSMENT — PAIN SCALES - GENERAL
PAINLEVEL_OUTOF10: 4
PAINLEVEL_OUTOF10: 9

## 2024-02-26 ASSESSMENT — LIFESTYLE VARIABLES
EVER HAD A DRINK FIRST THING IN THE MORNING TO STEADY YOUR NERVES TO GET RID OF A HANGOVER: NO
HAVE YOU EVER FELT YOU SHOULD CUT DOWN ON YOUR DRINKING: NO
EVER FELT BAD OR GUILTY ABOUT YOUR DRINKING: NO
HAVE PEOPLE ANNOYED YOU BY CRITICIZING YOUR DRINKING: NO

## 2024-02-26 ASSESSMENT — PAIN DESCRIPTION - PAIN TYPE: TYPE: ACUTE PAIN

## 2024-02-26 ASSESSMENT — PAIN DESCRIPTION - LOCATION: LOCATION: BUTTOCKS

## 2024-02-26 NOTE — ED PROVIDER NOTES
Chief Complaint   Patient presents with    Abscess     Pt c/o abscess between butt cheeks.       HPI       68 year old female presents to the Emergency Department today complaining of a 1 week history of a rectal abscess. Noted that she has been applying warm packs to the area. Notes that area has grown larger and has extended toward the anus over the past several days. Denies any associated fever, chills, headache, neck pain, chest pain, shortness of breath, abdominal pain, nausea, vomiting, diarrhea, constipation, or urinary symptoms.       History provided by:  Patient             Patient History   Past Medical History:   Diagnosis Date    Contact dermatitis 06/21/2023    Diarrhea 06/21/2023    Grief 06/21/2023    Personal history of non-Hodgkin lymphomas     History of non-Hodgkin's lymphoma    Personal history of other diseases of the musculoskeletal system and connective tissue     History of fibromyalgia    Personal history of other diseases of the musculoskeletal system and connective tissue     History of degenerative disc disease    Personal history of other diseases of the musculoskeletal system and connective tissue     History of arthritis    Personal history of other mental and behavioral disorders     History of panic attacks    Personal history of other mental and behavioral disorders     History of post traumatic stress disorder    Personal history of other specified conditions     History of seizure    Personal history of transient ischemic attack (TIA), and cerebral infarction without residual deficits     History of stroke     Past Surgical History:   Procedure Laterality Date    EYE SURGERY  06/28/2017    Eye Surgery    HYSTERECTOMY  06/28/2017    Hysterectomy    KNEE SURGERY  06/28/2017    Knee Surgery Left    OTHER SURGICAL HISTORY  06/28/2017    History Of Prior Surgery    OTHER SURGICAL HISTORY  06/28/2017    Cervical Surgery (Gyn)    OTHER SURGICAL HISTORY  10/29/2021    Tibia fracture  repair     Family History   Problem Relation Name Age of Onset    Cancer Mother      Heart disease Father      Stroke Father      Heart attack Father      Cancer Father's Sister       Social History     Tobacco Use    Smoking status: Never    Smokeless tobacco: Never   Substance Use Topics    Alcohol use: Not Currently    Drug use: Yes     Types: Marijuana           Physical Exam  Constitutional:       Appearance: Normal appearance.   HENT:      Head: Normocephalic.      Right Ear: Tympanic membrane, ear canal and external ear normal.      Left Ear: Tympanic membrane, ear canal and external ear normal.      Nose: Nose normal.      Mouth/Throat:      Mouth: Mucous membranes are moist.      Pharynx: Oropharynx is clear. No oropharyngeal exudate or posterior oropharyngeal erythema.   Eyes:      Conjunctiva/sclera: Conjunctivae normal.      Pupils: Pupils are equal, round, and reactive to light.   Cardiovascular:      Rate and Rhythm: Normal rate and regular rhythm.      Pulses:           Radial pulses are 3+ on the right side and 3+ on the left side.        Dorsalis pedis pulses are 3+ on the right side and 3+ on the left side.      Heart sounds: Normal heart sounds. No murmur heard.     No friction rub. No gallop.   Pulmonary:      Effort: Pulmonary effort is normal. No respiratory distress.      Breath sounds: Normal breath sounds. No wheezing, rhonchi or rales.   Abdominal:      General: Abdomen is flat. Bowel sounds are normal.      Palpations: Abdomen is soft.      Tenderness: There is no abdominal tenderness. There is no right CVA tenderness, left CVA tenderness, guarding or rebound. Negative signs include Umaña's sign and McBurney's sign.   Musculoskeletal:         General: No swelling or deformity.      Cervical back: Full passive range of motion without pain.      Right lower leg: No edema.      Left lower leg: No edema.   Lymphadenopathy:      Cervical: No cervical adenopathy.   Skin:     Capillary Refill:  Capillary refill takes less than 2 seconds.      Coloration: Skin is not jaundiced.      Findings: No rash.      Comments: Large indurated area to the left gluteal region that extends toward the anus. There is tenderness noted with a central area of fluctuance.    Neurological:      General: No focal deficit present.      Mental Status: She is alert and oriented to person, place, and time. Mental status is at baseline.      Gait: Gait is intact.   Psychiatric:         Mood and Affect: Mood normal.         Behavior: Behavior is cooperative.         Labs Reviewed   BASIC METABOLIC PANEL - Abnormal       Result Value    Glucose 112 (*)     Sodium 136      Potassium 3.5      Chloride 103      Bicarbonate 27      Anion Gap 10      Urea Nitrogen 8      Creatinine 0.79      eGFR 82      Calcium 9.4     LACTATE - Normal    Lactate 1.2      Narrative:     Venipuncture immediately after or during the administration of Metamizole may lead to falsely low results. Testing should be performed immediately  prior to Metamizole dosing.   CBC WITH AUTO DIFFERENTIAL    WBC 6.5      nRBC 0.0      RBC 4.36      Hemoglobin 12.8      Hematocrit 37.9      MCV 87      MCH 29.4      MCHC 33.8      RDW 13.0      Platelets 202      Neutrophils % 66.8      Immature Granulocytes %, Automated 0.3      Lymphocytes % 21.0      Monocytes % 7.4      Eosinophils % 3.9      Basophils % 0.6      Neutrophils Absolute 4.34      Immature Granulocytes Absolute, Automated 0.02      Lymphocytes Absolute 1.36      Monocytes Absolute 0.48      Eosinophils Absolute 0.25      Basophils Absolute 0.04         CT abdomen pelvis w IV contrast   Final Result   14 mm 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   Diverticulosis coli.   Signed by Ash Retana MD               ED Course & MDM            Medical Decision Making  Patient was seen and evaluated by Dr. James. Saline lock was established with labs drawn and results as above. Blood counts,  electrolytes, kidney function, and lactate were unremarkable. CT scan of her abdomen and pelvis shows a 14mm 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 Upon noting the CT results, I discussed the case with Dr. Brooks, surgeon on call, who agrees to evaluate the patient on consult.     Diagnostic Impression:    1. 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            Your medication list        ASK your doctor about these medications        Instructions Last Dose Given Next Dose Due   atorvastatin 40 mg tablet  Commonly known as: Lipitor      Take 1 tablet (40 mg) by mouth once daily at bedtime.       B Complex-Vitamin B12 tablet  Generic drug: vitamin B complex           calcium carbonate-vitamin D3 600 mg-20 mcg (800 unit) tablet           cholecalciferol 400 unit capsule  Commonly known as: Vitamin D-3           clobetasol 0.05 % cream  Commonly known as: Temovate           cyclobenzaprine 10 mg tablet  Commonly known as: Flexeril      Take 1 tablet (10 mg) by mouth once daily.       escitalopram 20 mg tablet  Commonly known as: Lexapro           ferrous sulfate (325 mg ferrous sulfate) tablet           hydrOXYzine HCL 50 mg tablet  Commonly known as: Atarax           ketoconazole 2 % shampoo  Commonly known as: NIZOral           lisinopril 30 mg tablet      Take 1 tablet (30 mg) by mouth once daily.       loperamide 2 mg capsule  Commonly known as: Imodium           multivitamin tablet           omega-3 fatty acids-fish oil 300-1,000 mg capsule           omeprazole 20 mg DR capsule  Commonly known as: PriLOSEC      Take 1 capsule (20 mg) by mouth once daily at bedtime.                  Procedure  Procedures     Vega Izaguirre, KEI-CNP  02/26/24 3537

## 2024-02-26 NOTE — PROGRESS NOTES
Emergency Medicine Transition of Care Note.    I received Renu Patel in signout from Dr. James.  Please see the previous ED provider note for all HPI, PE and MDM up to the time of signout at 1600. This is in addition to the primary record.    In brief Renu Patel is an 68 y.o. female presenting for abscess  Chief Complaint   Patient presents with    Abscess     Pt c/o abscess between butt cheeks.     At the time of signout we were awaiting: Dr. Brooks consult    Diagnoses as of 02/26/24 1758   Abscess       Medical Decision Making  Dr. Brooks came to evaluate the patient in the emergency department.  He is recommending a bedside incision and drainage by the ER with close follow-up to him as an outpatient.  Incision and drainage was performed by myself, see procedure note for full details, patient tolerated the procedure well without incident.  Patient was discharged with prescription for Augmentin and follow-up to Dr. Brooks in the next 48 hours.  Strict return precautions were given and discussed.    Final diagnoses:   [L02.91] Abscess           Procedure  Incision and Drainage    Performed by: Eliel Sena MD  Authorized by: Azalia James MD    Consent:     Consent obtained:  Verbal    Consent given by:  Patient    Risks, benefits, and alternatives were discussed: yes      Risks discussed:  Bleeding, incomplete drainage and pain    Alternatives discussed:  No treatment and alternative treatment  Universal protocol:     Patient identity confirmed:  Verbally with patient  Location:     Type:  Abscess    Size:  14mm on CT    Location:  Anogenital    Anogenital location:  Perianal  Pre-procedure details:     Procedure prep: alcohol.  Sedation:     Sedation type:  None  Anesthesia:     Anesthesia method:  Local infiltration    Local anesthetic:  Lidocaine 1% WITH epi  Procedure type:     Complexity:  Simple  Procedure details:     Ultrasound guidance: no      Needle aspiration: no       Incision types:  Cruciate    Incision depth:  Subcutaneous    Wound management:  Probed and deloculated and irrigated with saline    Drainage:  Purulent    Drainage amount:  Moderate    Wound treatment:  Wound left open    Packing materials:  1/2 in iodoform gauze  Post-procedure details:     Procedure completion:  Tolerated well, no immediate complications      Eliel Sena MD

## 2024-02-26 NOTE — CONSULTS
Reason For Consult  Perianal abscess    History Of Present Illness  Renu Patel is a 68 y.o. female presenting with 1 week history of perianal abscess.  She notes progressive swelling now to the size of a small walnut right at the gluteal fold.  She had a CAT scan today which shows possibility of fistula to the rectum.  She does give a history of falling down and having an open wound in this area although she is not sure exactly the location.  This happened 20 years ago.  She has had no fistula or problem in this area..     Past Medical History  She has a past medical history of Contact dermatitis (06/21/2023), Diarrhea (06/21/2023), Grief (06/21/2023), Personal history of non-Hodgkin lymphomas, Personal history of other diseases of the musculoskeletal system and connective tissue, Personal history of other diseases of the musculoskeletal system and connective tissue, Personal history of other diseases of the musculoskeletal system and connective tissue, Personal history of other mental and behavioral disorders, Personal history of other mental and behavioral disorders, Personal history of other specified conditions, and Personal history of transient ischemic attack (TIA), and cerebral infarction without residual deficits.    Surgical History  She has a past surgical history that includes Eye surgery (06/28/2017); Knee surgery (06/28/2017); Other surgical history (06/28/2017); Hysterectomy (06/28/2017); Other surgical history (06/28/2017); and Other surgical history (10/29/2021).     Social History  She reports that she has never smoked. She has never used smokeless tobacco. She reports that she does not currently use alcohol. She reports current drug use. Drug: Marijuana.    Family History  Family History   Problem Relation Name Age of Onset    Cancer Mother      Heart disease Father      Stroke Father      Heart attack Father      Cancer Father's Sister          Allergies  Adhesive tape-silicones, Codeine,  "Meloxicam, Meperidine, Morphine, Oxycodone, Oxycodone-acetaminophen, Quetiapine, Sulfadoxine, Tizanidine, Topiramate, and Trazodone    Review of Systems  Noncontributory     Physical Exam  Awake alert no acute distress exam of the Brayan anal and gluteal area reveals a small walnut sized versus large acorn sized immediate subcutaneous fluctuant area at the gluteal cleft.     Last Recorded Vitals  Blood pressure 155/73, pulse (!) 105, temperature 36.9 °C (98.5 °F), temperature source Tympanic, resp. rate 20, height 1.575 m (5' 2\"), weight 72.6 kg (160 lb), SpO2 95 %.    Relevant Results  See CAT scan results     Assessment/Plan     Patient has a very small perianal/gluteal subcutaneous abscess.  This may or may not be associated with the fistula as the tract could not be palpated.  Final determination will rest on her clinical course.  I have recommended local incision and drainage under local anesthesia to the emergency room physician.  She can follow-up with me in 2 days as an outpatient in the office.    I spent 20   minutes in the professional and overall care of this patient.      Ricky Brooks MD    "

## 2024-04-09 ENCOUNTER — ANESTHESIA EVENT (OUTPATIENT)
Dept: OPERATING ROOM | Facility: HOSPITAL | Age: 69
End: 2024-04-09
Payer: COMMERCIAL

## 2024-04-15 ENCOUNTER — HOSPITAL ENCOUNTER (OUTPATIENT)
Dept: CARDIOLOGY | Facility: HOSPITAL | Age: 69
Discharge: HOME | End: 2024-04-15
Payer: COMMERCIAL

## 2024-04-15 ENCOUNTER — PRE-ADMISSION TESTING (OUTPATIENT)
Dept: PREADMISSION TESTING | Facility: HOSPITAL | Age: 69
End: 2024-04-15
Payer: COMMERCIAL

## 2024-04-15 VITALS
HEART RATE: 84 BPM | OXYGEN SATURATION: 96 % | WEIGHT: 158 LBS | HEIGHT: 62 IN | RESPIRATION RATE: 20 BRPM | TEMPERATURE: 98.2 F | SYSTOLIC BLOOD PRESSURE: 144 MMHG | DIASTOLIC BLOOD PRESSURE: 84 MMHG | BODY MASS INDEX: 29.08 KG/M2

## 2024-04-15 DIAGNOSIS — I10 ESSENTIAL HYPERTENSION: ICD-10-CM

## 2024-04-15 DIAGNOSIS — Z85.72 HX OF LYMPHOMA, NON-HODGKINS: ICD-10-CM

## 2024-04-15 DIAGNOSIS — Z01.818 PRE-OP EVALUATION: ICD-10-CM

## 2024-04-15 DIAGNOSIS — J44.9 CHRONIC OBSTRUCTIVE PULMONARY DISEASE, UNSPECIFIED COPD TYPE (MULTI): ICD-10-CM

## 2024-04-15 DIAGNOSIS — R22.9 SUBCUTANEOUS MASS: ICD-10-CM

## 2024-04-15 DIAGNOSIS — Z01.818 PRE-OP EVALUATION: Primary | ICD-10-CM

## 2024-04-15 LAB
ANION GAP SERPL CALC-SCNC: 12 MMOL/L (ref 10–20)
ATRIAL RATE: 65 BPM
BUN SERPL-MCNC: 11 MG/DL (ref 6–23)
CALCIUM SERPL-MCNC: 9.5 MG/DL (ref 8.6–10.3)
CHLORIDE SERPL-SCNC: 103 MMOL/L (ref 98–107)
CO2 SERPL-SCNC: 26 MMOL/L (ref 21–32)
CREAT SERPL-MCNC: 0.84 MG/DL (ref 0.5–1.05)
EGFRCR SERPLBLD CKD-EPI 2021: 76 ML/MIN/1.73M*2
ERYTHROCYTE [DISTWIDTH] IN BLOOD BY AUTOMATED COUNT: 13.1 % (ref 11.5–14.5)
GLUCOSE SERPL-MCNC: 99 MG/DL (ref 74–99)
HCT VFR BLD AUTO: 37.8 % (ref 36–46)
HGB BLD-MCNC: 13 G/DL (ref 12–16)
MCH RBC QN AUTO: 29.9 PG (ref 26–34)
MCHC RBC AUTO-ENTMCNC: 34.4 G/DL (ref 32–36)
MCV RBC AUTO: 87 FL (ref 80–100)
NRBC BLD-RTO: 0 /100 WBCS (ref 0–0)
P AXIS: 52 DEGREES
PLATELET # BLD AUTO: 192 X10*3/UL (ref 150–450)
POTASSIUM SERPL-SCNC: 3.4 MMOL/L (ref 3.5–5.3)
PR INTERVAL: 169 MS
Q ONSET: 249 MS
QRS COUNT: 11 BEATS
QRS DURATION: 101 MS
QT INTERVAL: 441 MS
QTC CALCULATION(BAZETT): 463 MS
QTC FREDERICIA: 455 MS
R AXIS: 11 DEGREES
RBC # BLD AUTO: 4.35 X10*6/UL (ref 4–5.2)
SODIUM SERPL-SCNC: 138 MMOL/L (ref 136–145)
T AXIS: 133 DEGREES
T OFFSET: 470 MS
VENTRICULAR RATE: 66 BPM
WBC # BLD AUTO: 5.9 X10*3/UL (ref 4.4–11.3)

## 2024-04-15 PROCEDURE — 93005 ELECTROCARDIOGRAM TRACING: CPT

## 2024-04-15 PROCEDURE — 85027 COMPLETE CBC AUTOMATED: CPT

## 2024-04-15 PROCEDURE — 82565 ASSAY OF CREATININE: CPT

## 2024-04-15 PROCEDURE — 99203 OFFICE O/P NEW LOW 30 MIN: CPT | Performed by: CLINICAL NURSE SPECIALIST

## 2024-04-15 PROCEDURE — 36415 COLL VENOUS BLD VENIPUNCTURE: CPT

## 2024-04-15 ASSESSMENT — CHADS2 SCORE
PRIOR STROKE OR TIA OR THROMBOEMBOLISM: YES
HYPERTENSION: YES
CHADS2 SCORE: 3
DIABETES: NO
CHF: NO
AGE GREATER THAN OR EQUAL TO 75: NO

## 2024-04-15 ASSESSMENT — ENCOUNTER SYMPTOMS
SKIN CHANGES: 1
NECK NEGATIVE: 1
CARDIOVASCULAR NEGATIVE: 1
MUSCULOSKELETAL NEGATIVE: 1
CONSTITUTIONAL NEGATIVE: 1
RESPIRATORY NEGATIVE: 1
ENDOCRINE NEGATIVE: 1
GASTROINTESTINAL NEGATIVE: 1
EYES NEGATIVE: 1
NEUROLOGICAL NEGATIVE: 1

## 2024-04-15 ASSESSMENT — LIFESTYLE VARIABLES: SMOKING_STATUS: NONSMOKER

## 2024-04-15 NOTE — CPM/PAT H&P
CPM/PAT Evaluation       Name: Renu Patel (Renu Patel)  /Age: 1955/68 y.o.     In-Person       Chief Complaint: Scheduled for Excision of right deltoid subcutaneous mass and punch box of left back cutaneous lesion under MAC anesthesia per Dr. Brooks on 24.   Patient states she had some lower back lesions in the past that were itchy in nature and she was subsequently diagnosed with non-hodgkin's lymphoma after the lesions were biopsied at that time. She states these current lesions are also itchy.         Past Medical History:   Diagnosis Date    Contact dermatitis 2023    Diarrhea 2023    Full dentures     wears only uppers    Grief 2023    Personal history of non-Hodgkin lymphomas     History of non-Hodgkin's lymphoma    Personal history of other diseases of the musculoskeletal system and connective tissue     History of fibromyalgia    Personal history of other diseases of the musculoskeletal system and connective tissue     History of degenerative disc disease    Personal history of other diseases of the musculoskeletal system and connective tissue     History of arthritis    Personal history of other mental and behavioral disorders     History of panic attacks    Personal history of other mental and behavioral disorders     History of post traumatic stress disorder    Personal history of other specified conditions     History of seizure    Personal history of transient ischemic attack (TIA), and cerebral infarction without residual deficits     History of stroke    PTSD (post-traumatic stress disorder)     Vision loss        Past Surgical History:   Procedure Laterality Date    EYE SURGERY  2017    Eye Surgery    HYSTERECTOMY  2017    Hysterectomy    KNEE SURGERY  2017    Knee Surgery Left    OTHER SURGICAL HISTORY  2017    History Of Prior Surgery    OTHER SURGICAL HISTORY  2017    Cervical Surgery (Gyn)    OTHER SURGICAL HISTORY   10/29/2021    Tibia fracture repair       Patient  has no history on file for sexual activity.    Family History   Problem Relation Name Age of Onset    Cancer Mother      Heart disease Father      Stroke Father      Heart attack Father      Cancer Father's Sister         Allergies   Allergen Reactions    Adhesive Tape-Silicones Unknown    Codeine Headache    Meloxicam Unknown    Meperidine Unknown    Morphine Unknown    Oxycodone Unknown    Oxycodone-Acetaminophen Unknown    Quetiapine Unknown    Sulfadoxine Unknown    Tizanidine Itching    Topiramate Unknown    Trazodone Unknown       Prior to Admission medications    Medication Sig Start Date End Date Taking? Authorizing Provider   atorvastatin (Lipitor) 40 mg tablet Take 1 tablet (40 mg) by mouth once daily at bedtime. 12/18/23 6/15/24  [facility]   calcium carbonate-vitamin D3 600 mg-20 mcg (800 unit) tablet Take 1 tablet by mouth 2 times a day.    Historical Provider, MD   cholecalciferol, vitamin D3, 10 mcg (400 unit) capsule Take 1 capsule (10 mcg) by mouth once daily.    Historical Provider, MD   clobetasol (Temovate) 0.05 % cream APPLY SPARINGLY TOPICALLY TO THE AFFECTED AREA THREE TIMES DAILY    Historical Provider, MD   cyclobenzaprine (Flexeril) 10 mg tablet Take 1 tablet (10 mg) by mouth once daily. 12/18/23 3/17/24  [facility] Call   escitalopram (Lexapro) 20 mg tablet Take 1 tablet (20 mg) by mouth once daily.    Historical Provider, MD   ferrous sulfate 325 (65 Fe) MG tablet Take 1 tablet by mouth once daily with breakfast.    Historical Provider, MD   hydrOXYzine HCL (Atarax) 50 mg tablet Take 1-2 tablets ( mg) by mouth as needed at bedtime. 4/26/23   Historical Provider, MD   ketoconazole (NIZOral) 2 % shampoo APPLY TOPICALLY TO THE AFFECTED AREA TWICE DAILY AS DIRECTED    Historical Provider, MD   lisinopril 30 mg tablet Take 1 tablet (30 mg) by mouth once daily. 12/18/23 6/15/24  [facility] Call   loperamide  (Imodium) 2 mg capsule Take 1 capsule (2 mg) by mouth 4 times a day as needed for diarrhea. 1/26/22   Historical Provider, MD   multivitamin tablet Take 1 tablet by mouth once daily.    Historical Provider, MD   omega-3 fatty acids-fish oil 300-1,000 mg capsule 1 cap(s) orally once a day    Historical Provider, MD   omeprazole (PriLOSEC) 20 mg DR capsule Take 1 capsule (20 mg) by mouth once daily at bedtime. 12/18/23 6/15/24  [facility]   vitamin B complex (B Complex-Vitamin B12) tablet Take 1 tablet by mouth once daily.    Historical Provider, MD ARZATE ROS:   Constitutional:   neg    Neuro/Psych:   neg    Eyes:   neg    Ears:   neg    Nose:   neg    Mouth:   neg    Throat:   neg    Neck:   neg    Cardio:   neg    Respiratory:   neg    Endocrine:   neg    GI:   neg    :   neg    Musculoskeletal:   neg    Hematologic:   neg    Skin:   Lesions right arm and left back - itchy  neg     skin changes      Physical Exam  Vitals and nursing note reviewed. Physical exam within normal limits.   Constitutional:       Appearance: Normal appearance.   HENT:      Head: Normocephalic.      Nose: Nose normal.   Eyes:      Pupils: Pupils are equal, round, and reactive to light.   Cardiovascular:      Rate and Rhythm: Normal rate and regular rhythm.      Heart sounds: Murmur heard.      Systolic murmur is present with a grade of 1/6.   Pulmonary:      Effort: Pulmonary effort is normal.      Breath sounds: Normal breath sounds.      Comments: Lungs clear throughout all fields.   Abdominal:      General: Bowel sounds are normal.      Palpations: Abdomen is soft.   Musculoskeletal:         General: Normal range of motion.      Cervical back: Normal range of motion.      Right lower leg: No edema.      Left lower leg: No edema.   Skin:     General: Skin is warm and dry.      Comments: Masses/lesions easily palpable on right arm and left side of back. No open areas, redness, or drainage noted.    Neurological:       Mental Status: She is alert and oriented to person, place, and time.   Psychiatric:         Mood and Affect: Mood normal.         Behavior: Behavior normal.          PAT AIRWAY:   Airway:     Mallampati::  II    Neck ROM::  Full   upper dentures      Visit Vitals  /84   Pulse 84   Temp 36.8 °C (98.2 °F) (Oral)   Resp 20       DASI Risk Score    No data to display       Caprini DVT Assessment      Flowsheet Row Most Recent Value   DVT Score 14   Current Status COPD, Minor surgery planned   History Previous malignancy, COPD, Stroke, Prior major surgery   Age 60-75 years   BMI 30 or less          Modified Frailty Index    No data to display       CHADS2 Stroke Risk  Current as of 18 minutes ago        N/A 3 to 100%: High Risk   2 to < 3%: Medium Risk   0 to < 2%: Low Risk     Last Change: N/A          This score determines the patient's risk of having a stroke if the patient has atrial fibrillation.        This score is not applicable to this patient. Components are not calculated.          Revised Cardiac Risk Index      Flowsheet Row Most Recent Value   Revised Cardiac Risk Calculator 1          Apfel Simplified Score      Flowsheet Row Most Recent Value   Apfel Simplified Score Calculator 2          Risk Analysis Index Results This Encounter    No data found in the last 1 encounters.       Stop Bang Score      Flowsheet Row Most Recent Value   Do you snore loudly? 0   Do you often feel tired or fatigued after your sleep? 0   Has anyone ever observed you stop breathing in your sleep? 0   Do you have or are you being treated for high blood pressure? 1   Recent BMI (Calculated) 29.3   Is BMI greater than 35 kg/m2? 0=No   Age older than 50 years old? 1=Yes   Is your neck circumference greater than 17 inches (Male) or 16 inches (Female)? 0   Gender - Male 0=No   STOP-BANG Total Score 2            Assessment and Plan:     Other:  Scheduled for Excision of right deltoid subcutaneous mass and punch box of left back  cutaneous lesion under MAC anesthesia per Dr. Brooks on 4/19/24.   CBC, BMP ordered. Results pending   EKG ordered, result pending.   All surgery instructions reviewed with patient by RN. Patient verbalized understanding.

## 2024-04-15 NOTE — PREPROCEDURE INSTRUCTIONS
Medication List            Accurate as of April 15, 2024  1:45 PM. Always use your most recent med list.                atorvastatin 40 mg tablet  Commonly known as: Lipitor  Take 1 tablet (40 mg) by mouth once daily at bedtime.     B Complex-Vitamin B12 tablet  Generic drug: vitamin B complex     calcium carbonate-vitamin D3 600 mg-20 mcg (800 unit) tablet     cholecalciferol 400 unit capsule  Commonly known as: Vitamin D-3     clobetasol 0.05 % cream  Commonly known as: Temovate     cyclobenzaprine 10 mg tablet  Commonly known as: Flexeril  Take 1 tablet (10 mg) by mouth once daily.     escitalopram 20 mg tablet  Commonly known as: Lexapro     ferrous sulfate (325 mg ferrous sulfate) tablet     hydrOXYzine HCL 50 mg tablet  Commonly known as: Atarax     ketoconazole 2 % shampoo  Commonly known as: NIZOral     lisinopril 30 mg tablet  Take 1 tablet (30 mg) by mouth once daily.     loperamide 2 mg capsule  Commonly known as: Imodium     multivitamin tablet     omega-3 fatty acids-fish oil 300-1,000 mg capsule     omeprazole 20 mg DR capsule  Commonly known as: PriLOSEC  Take 1 capsule (20 mg) by mouth once daily at bedtime.                              NPO Instructions:    Nothing to eat or drink after midnight  Hydrate well the day before  No medications morning of     Additional Instructions:     Shower morning of deodorant only  Wear loose fitting clothing  No jewelry,lotions, powder or make-up morning up  Call with any questions [phone]

## 2024-04-15 NOTE — H&P (VIEW-ONLY)
CPM/PAT Evaluation       Name: Renu Patel (Renu Patel)  /Age: 1955/68 y.o.     In-Person       Chief Complaint: Scheduled for Excision of right deltoid subcutaneous mass and punch box of left back cutaneous lesion under MAC anesthesia per Dr. Brooks on 24.   Patient states she had some lower back lesions in the past that were itchy in nature and she was subsequently diagnosed with non-hodgkin's lymphoma after the lesions were biopsied at that time. She states these current lesions are also itchy.         Past Medical History:   Diagnosis Date    Contact dermatitis 2023    Diarrhea 2023    Full dentures     wears only uppers    Grief 2023    Personal history of non-Hodgkin lymphomas     History of non-Hodgkin's lymphoma    Personal history of other diseases of the musculoskeletal system and connective tissue     History of fibromyalgia    Personal history of other diseases of the musculoskeletal system and connective tissue     History of degenerative disc disease    Personal history of other diseases of the musculoskeletal system and connective tissue     History of arthritis    Personal history of other mental and behavioral disorders     History of panic attacks    Personal history of other mental and behavioral disorders     History of post traumatic stress disorder    Personal history of other specified conditions     History of seizure    Personal history of transient ischemic attack (TIA), and cerebral infarction without residual deficits     History of stroke    PTSD (post-traumatic stress disorder)     Vision loss        Past Surgical History:   Procedure Laterality Date    EYE SURGERY  2017    Eye Surgery    HYSTERECTOMY  2017    Hysterectomy    KNEE SURGERY  2017    Knee Surgery Left    OTHER SURGICAL HISTORY  2017    History Of Prior Surgery    OTHER SURGICAL HISTORY  2017    Cervical Surgery (Gyn)    OTHER SURGICAL HISTORY   10/29/2021    Tibia fracture repair       Patient  has no history on file for sexual activity.    Family History   Problem Relation Name Age of Onset    Cancer Mother      Heart disease Father      Stroke Father      Heart attack Father      Cancer Father's Sister         Allergies   Allergen Reactions    Adhesive Tape-Silicones Unknown    Codeine Headache    Meloxicam Unknown    Meperidine Unknown    Morphine Unknown    Oxycodone Unknown    Oxycodone-Acetaminophen Unknown    Quetiapine Unknown    Sulfadoxine Unknown    Tizanidine Itching    Topiramate Unknown    Trazodone Unknown       Prior to Admission medications    Medication Sig Start Date End Date Taking? Authorizing Provider   atorvastatin (Lipitor) 40 mg tablet Take 1 tablet (40 mg) by mouth once daily at bedtime. 12/18/23 6/15/24  [facility]   calcium carbonate-vitamin D3 600 mg-20 mcg (800 unit) tablet Take 1 tablet by mouth 2 times a day.    Historical Provider, MD   cholecalciferol, vitamin D3, 10 mcg (400 unit) capsule Take 1 capsule (10 mcg) by mouth once daily.    Historical Provider, MD   clobetasol (Temovate) 0.05 % cream APPLY SPARINGLY TOPICALLY TO THE AFFECTED AREA THREE TIMES DAILY    Historical Provider, MD   cyclobenzaprine (Flexeril) 10 mg tablet Take 1 tablet (10 mg) by mouth once daily. 12/18/23 3/17/24  [facility] Call   escitalopram (Lexapro) 20 mg tablet Take 1 tablet (20 mg) by mouth once daily.    Historical Provider, MD   ferrous sulfate 325 (65 Fe) MG tablet Take 1 tablet by mouth once daily with breakfast.    Historical Provider, MD   hydrOXYzine HCL (Atarax) 50 mg tablet Take 1-2 tablets ( mg) by mouth as needed at bedtime. 4/26/23   Historical Provider, MD   ketoconazole (NIZOral) 2 % shampoo APPLY TOPICALLY TO THE AFFECTED AREA TWICE DAILY AS DIRECTED    Historical Provider, MD   lisinopril 30 mg tablet Take 1 tablet (30 mg) by mouth once daily. 12/18/23 6/15/24  [facility] Call   loperamide  (Imodium) 2 mg capsule Take 1 capsule (2 mg) by mouth 4 times a day as needed for diarrhea. 1/26/22   Historical Provider, MD   multivitamin tablet Take 1 tablet by mouth once daily.    Historical Provider, MD   omega-3 fatty acids-fish oil 300-1,000 mg capsule 1 cap(s) orally once a day    Historical Provider, MD   omeprazole (PriLOSEC) 20 mg DR capsule Take 1 capsule (20 mg) by mouth once daily at bedtime. 12/18/23 6/15/24  [facility]   vitamin B complex (B Complex-Vitamin B12) tablet Take 1 tablet by mouth once daily.    Historical Provider, MD ARZATE ROS:   Constitutional:   neg    Neuro/Psych:   neg    Eyes:   neg    Ears:   neg    Nose:   neg    Mouth:   neg    Throat:   neg    Neck:   neg    Cardio:   neg    Respiratory:   neg    Endocrine:   neg    GI:   neg    :   neg    Musculoskeletal:   neg    Hematologic:   neg    Skin:   Lesions right arm and left back - itchy  neg     skin changes      Physical Exam  Vitals and nursing note reviewed. Physical exam within normal limits.   Constitutional:       Appearance: Normal appearance.   HENT:      Head: Normocephalic.      Nose: Nose normal.   Eyes:      Pupils: Pupils are equal, round, and reactive to light.   Cardiovascular:      Rate and Rhythm: Normal rate and regular rhythm.      Heart sounds: Murmur heard.      Systolic murmur is present with a grade of 1/6.   Pulmonary:      Effort: Pulmonary effort is normal.      Breath sounds: Normal breath sounds.      Comments: Lungs clear throughout all fields.   Abdominal:      General: Bowel sounds are normal.      Palpations: Abdomen is soft.   Musculoskeletal:         General: Normal range of motion.      Cervical back: Normal range of motion.      Right lower leg: No edema.      Left lower leg: No edema.   Skin:     General: Skin is warm and dry.      Comments: Masses/lesions easily palpable on right arm and left side of back. No open areas, redness, or drainage noted.    Neurological:       Mental Status: She is alert and oriented to person, place, and time.   Psychiatric:         Mood and Affect: Mood normal.         Behavior: Behavior normal.          PAT AIRWAY:   Airway:     Mallampati::  II    Neck ROM::  Full   upper dentures      Visit Vitals  /84   Pulse 84   Temp 36.8 °C (98.2 °F) (Oral)   Resp 20       DASI Risk Score    No data to display       Caprini DVT Assessment      Flowsheet Row Most Recent Value   DVT Score 14   Current Status COPD, Minor surgery planned   History Previous malignancy, COPD, Stroke, Prior major surgery   Age 60-75 years   BMI 30 or less          Modified Frailty Index    No data to display       CHADS2 Stroke Risk  Current as of 18 minutes ago        N/A 3 to 100%: High Risk   2 to < 3%: Medium Risk   0 to < 2%: Low Risk     Last Change: N/A          This score determines the patient's risk of having a stroke if the patient has atrial fibrillation.        This score is not applicable to this patient. Components are not calculated.          Revised Cardiac Risk Index      Flowsheet Row Most Recent Value   Revised Cardiac Risk Calculator 1          Apfel Simplified Score      Flowsheet Row Most Recent Value   Apfel Simplified Score Calculator 2          Risk Analysis Index Results This Encounter    No data found in the last 1 encounters.       Stop Bang Score      Flowsheet Row Most Recent Value   Do you snore loudly? 0   Do you often feel tired or fatigued after your sleep? 0   Has anyone ever observed you stop breathing in your sleep? 0   Do you have or are you being treated for high blood pressure? 1   Recent BMI (Calculated) 29.3   Is BMI greater than 35 kg/m2? 0=No   Age older than 50 years old? 1=Yes   Is your neck circumference greater than 17 inches (Male) or 16 inches (Female)? 0   Gender - Male 0=No   STOP-BANG Total Score 2            Assessment and Plan:     Other:  Scheduled for Excision of right deltoid subcutaneous mass and punch box of left back  cutaneous lesion under MAC anesthesia per Dr. Broosk on 4/19/24.   CBC, BMP ordered. Results pending   EKG ordered, result pending.   All surgery instructions reviewed with patient by RN. Patient verbalized understanding.

## 2024-04-19 ENCOUNTER — HOSPITAL ENCOUNTER (OUTPATIENT)
Facility: HOSPITAL | Age: 69
Setting detail: OUTPATIENT SURGERY
Discharge: HOME | End: 2024-04-19
Attending: SURGERY | Admitting: SURGERY
Payer: COMMERCIAL

## 2024-04-19 ENCOUNTER — ANESTHESIA (OUTPATIENT)
Dept: OPERATING ROOM | Facility: HOSPITAL | Age: 69
End: 2024-04-19
Payer: COMMERCIAL

## 2024-04-19 VITALS
DIASTOLIC BLOOD PRESSURE: 64 MMHG | HEIGHT: 62 IN | OXYGEN SATURATION: 97 % | SYSTOLIC BLOOD PRESSURE: 146 MMHG | RESPIRATION RATE: 20 BRPM | HEART RATE: 63 BPM | WEIGHT: 158 LBS | TEMPERATURE: 97.7 F | BODY MASS INDEX: 29.08 KG/M2

## 2024-04-19 DIAGNOSIS — R20.8 BURNING SENSATION: ICD-10-CM

## 2024-04-19 DIAGNOSIS — M75.81 OTHER SHOULDER LESIONS, RIGHT SHOULDER: ICD-10-CM

## 2024-04-19 DIAGNOSIS — L98.9 BACK SKIN LESION: ICD-10-CM

## 2024-04-19 PROCEDURE — 2500000004 HC RX 250 GENERAL PHARMACY W/ HCPCS (ALT 636 FOR OP/ED): Performed by: LICENSED PRACTICAL NURSE

## 2024-04-19 PROCEDURE — 2720000007 HC OR 272 NO HCPCS: Performed by: SURGERY

## 2024-04-19 PROCEDURE — 3600000008 HC OR TIME - EACH INCREMENTAL 1 MINUTE - PROCEDURE LEVEL THREE: Performed by: SURGERY

## 2024-04-19 PROCEDURE — 7100000001 HC RECOVERY ROOM TIME - INITIAL BASE CHARGE: Performed by: SURGERY

## 2024-04-19 PROCEDURE — 3700000001 HC GENERAL ANESTHESIA TIME - INITIAL BASE CHARGE: Performed by: SURGERY

## 2024-04-19 PROCEDURE — 2500000004 HC RX 250 GENERAL PHARMACY W/ HCPCS (ALT 636 FOR OP/ED): Performed by: ANESTHESIOLOGY

## 2024-04-19 PROCEDURE — 7100000009 HC PHASE TWO TIME - INITIAL BASE CHARGE: Performed by: SURGERY

## 2024-04-19 PROCEDURE — 3700000002 HC GENERAL ANESTHESIA TIME - EACH INCREMENTAL 1 MINUTE: Performed by: SURGERY

## 2024-04-19 PROCEDURE — 7100000010 HC PHASE TWO TIME - EACH INCREMENTAL 1 MINUTE: Performed by: SURGERY

## 2024-04-19 PROCEDURE — 2500000005 HC RX 250 GENERAL PHARMACY W/O HCPCS: Performed by: LICENSED PRACTICAL NURSE

## 2024-04-19 PROCEDURE — 3600000003 HC OR TIME - INITIAL BASE CHARGE - PROCEDURE LEVEL THREE: Performed by: SURGERY

## 2024-04-19 PROCEDURE — 2500000005 HC RX 250 GENERAL PHARMACY W/O HCPCS: Performed by: SURGERY

## 2024-04-19 PROCEDURE — 88305 TISSUE EXAM BY PATHOLOGIST: CPT | Performed by: PATHOLOGY

## 2024-04-19 PROCEDURE — 0752T DGTZ GLS MCRSCP SLD LVL III: CPT | Mod: TC,PORLAB | Performed by: SURGERY

## 2024-04-19 PROCEDURE — 7100000002 HC RECOVERY ROOM TIME - EACH INCREMENTAL 1 MINUTE: Performed by: SURGERY

## 2024-04-19 PROCEDURE — 88304 TISSUE EXAM BY PATHOLOGIST: CPT | Performed by: PATHOLOGY

## 2024-04-19 RX ORDER — PROPOFOL 10 MG/ML
INJECTION, EMULSION INTRAVENOUS AS NEEDED
Status: DISCONTINUED | OUTPATIENT
Start: 2024-04-19 | End: 2024-04-19

## 2024-04-19 RX ORDER — FENTANYL CITRATE 50 UG/ML
INJECTION, SOLUTION INTRAMUSCULAR; INTRAVENOUS AS NEEDED
Status: DISCONTINUED | OUTPATIENT
Start: 2024-04-19 | End: 2024-04-19

## 2024-04-19 RX ORDER — ONDANSETRON HYDROCHLORIDE 2 MG/ML
4 INJECTION, SOLUTION INTRAVENOUS ONCE AS NEEDED
Status: DISCONTINUED | OUTPATIENT
Start: 2024-04-19 | End: 2024-04-19 | Stop reason: HOSPADM

## 2024-04-19 RX ORDER — ALBUTEROL SULFATE 0.83 MG/ML
2.5 SOLUTION RESPIRATORY (INHALATION) ONCE AS NEEDED
Status: DISCONTINUED | OUTPATIENT
Start: 2024-04-19 | End: 2024-04-19 | Stop reason: HOSPADM

## 2024-04-19 RX ORDER — PHENYLEPHRINE HYDROCHLORIDE 10 MG/ML
INJECTION INTRAVENOUS AS NEEDED
Status: DISCONTINUED | OUTPATIENT
Start: 2024-04-19 | End: 2024-04-19

## 2024-04-19 RX ORDER — FAMOTIDINE 10 MG/ML
20 INJECTION INTRAVENOUS ONCE
Status: COMPLETED | OUTPATIENT
Start: 2024-04-19 | End: 2024-04-19

## 2024-04-19 RX ORDER — LIDOCAINE HYDROCHLORIDE 10 MG/ML
0.1 INJECTION, SOLUTION EPIDURAL; INFILTRATION; INTRACAUDAL; PERINEURAL ONCE
Status: DISCONTINUED | OUTPATIENT
Start: 2024-04-19 | End: 2024-04-19 | Stop reason: HOSPADM

## 2024-04-19 RX ORDER — DIPHENHYDRAMINE HYDROCHLORIDE 50 MG/ML
12.5 INJECTION INTRAMUSCULAR; INTRAVENOUS ONCE AS NEEDED
Status: DISCONTINUED | OUTPATIENT
Start: 2024-04-19 | End: 2024-04-19 | Stop reason: HOSPADM

## 2024-04-19 RX ORDER — SODIUM CHLORIDE, SODIUM LACTATE, POTASSIUM CHLORIDE, CALCIUM CHLORIDE 600; 310; 30; 20 MG/100ML; MG/100ML; MG/100ML; MG/100ML
100 INJECTION, SOLUTION INTRAVENOUS CONTINUOUS
Status: DISCONTINUED | OUTPATIENT
Start: 2024-04-19 | End: 2024-04-19 | Stop reason: HOSPADM

## 2024-04-19 RX ORDER — LIDOCAINE HYDROCHLORIDE AND EPINEPHRINE 10; 10 MG/ML; UG/ML
INJECTION, SOLUTION INFILTRATION; PERINEURAL AS NEEDED
Status: DISCONTINUED | OUTPATIENT
Start: 2024-04-19 | End: 2024-04-19 | Stop reason: HOSPADM

## 2024-04-19 RX ORDER — BUPIVACAINE HYDROCHLORIDE 5 MG/ML
INJECTION, SOLUTION PERINEURAL AS NEEDED
Status: DISCONTINUED | OUTPATIENT
Start: 2024-04-19 | End: 2024-04-19 | Stop reason: HOSPADM

## 2024-04-19 RX ORDER — LABETALOL HYDROCHLORIDE 5 MG/ML
5 INJECTION, SOLUTION INTRAVENOUS ONCE AS NEEDED
Status: DISCONTINUED | OUTPATIENT
Start: 2024-04-19 | End: 2024-04-19 | Stop reason: HOSPADM

## 2024-04-19 RX ORDER — DROPERIDOL 2.5 MG/ML
0.62 INJECTION, SOLUTION INTRAMUSCULAR; INTRAVENOUS ONCE AS NEEDED
Status: DISCONTINUED | OUTPATIENT
Start: 2024-04-19 | End: 2024-04-19 | Stop reason: HOSPADM

## 2024-04-19 RX ORDER — HYDRALAZINE HYDROCHLORIDE 20 MG/ML
5 INJECTION INTRAMUSCULAR; INTRAVENOUS EVERY 30 MIN PRN
Status: DISCONTINUED | OUTPATIENT
Start: 2024-04-19 | End: 2024-04-19 | Stop reason: HOSPADM

## 2024-04-19 RX ORDER — LIDOCAINE HCL/PF 100 MG/5ML
SYRINGE (ML) INTRAVENOUS AS NEEDED
Status: DISCONTINUED | OUTPATIENT
Start: 2024-04-19 | End: 2024-04-19

## 2024-04-19 RX ADMIN — FENTANYL CITRATE 25 MCG: 50 INJECTION INTRAMUSCULAR; INTRAVENOUS at 11:10

## 2024-04-19 RX ADMIN — PROPOFOL 50 MG: 10 INJECTION, EMULSION INTRAVENOUS at 11:13

## 2024-04-19 RX ADMIN — LIDOCAINE HYDROCHLORIDE 50 MG: 20 INJECTION, SOLUTION INTRAVENOUS at 11:05

## 2024-04-19 RX ADMIN — PROPOFOL 50 MG: 10 INJECTION, EMULSION INTRAVENOUS at 11:05

## 2024-04-19 RX ADMIN — PROPOFOL 50 MG: 10 INJECTION, EMULSION INTRAVENOUS at 11:28

## 2024-04-19 RX ADMIN — FAMOTIDINE 20 MG: 10 INJECTION, SOLUTION INTRAVENOUS at 09:35

## 2024-04-19 RX ADMIN — PROPOFOL 50 MG: 10 INJECTION, EMULSION INTRAVENOUS at 11:18

## 2024-04-19 RX ADMIN — PROPOFOL 50 MG: 10 INJECTION, EMULSION INTRAVENOUS at 11:37

## 2024-04-19 RX ADMIN — FENTANYL CITRATE 50 MCG: 50 INJECTION INTRAMUSCULAR; INTRAVENOUS at 11:05

## 2024-04-19 RX ADMIN — SODIUM CHLORIDE, POTASSIUM CHLORIDE, SODIUM LACTATE AND CALCIUM CHLORIDE 100 ML/HR: 600; 310; 30; 20 INJECTION, SOLUTION INTRAVENOUS at 09:35

## 2024-04-19 RX ADMIN — PHENYLEPHRINE HYDROCHLORIDE 100 MCG: 10 INJECTION INTRAVENOUS at 11:35

## 2024-04-19 RX ADMIN — PROPOFOL 50 MG: 10 INJECTION, EMULSION INTRAVENOUS at 11:07

## 2024-04-19 SDOH — HEALTH STABILITY: MENTAL HEALTH: CURRENT SMOKER: 1

## 2024-04-19 ASSESSMENT — PAIN SCALES - GENERAL
PAINLEVEL_OUTOF10: 0 - NO PAIN
PAINLEVEL_OUTOF10: 8
PAIN_LEVEL: 0
PAINLEVEL_OUTOF10: 0 - NO PAIN
PAINLEVEL_OUTOF10: 0 - NO PAIN

## 2024-04-19 ASSESSMENT — PAIN - FUNCTIONAL ASSESSMENT
PAIN_FUNCTIONAL_ASSESSMENT: 0-10
PAIN_FUNCTIONAL_ASSESSMENT: 0-10

## 2024-04-19 NOTE — ANESTHESIA PREPROCEDURE EVALUATION
Patient: Renu Patel    Procedure Information       Date/Time: 04/19/24 1030    Procedures:       EXCISION RIGHT DELTOID SUBCUTANEOUS MASS and PUNCH BIOPSY OF LEFT BACK CUTANEOUS LESION 3CM (Right: Shoulder) - 30 MIN      . (Left: Back) - 30 MIN    Location: [facility] 02 / Virtual [facility]    Surgeons: Ricky Brooks MD            Relevant Problems   Anesthesia (within normal limits)      Cardiac   (+) Essential hypertension   (+) Hyperlipidemia      Pulmonary   (+) Chronic obstructive pulmonary disease (Multi)      Neuro   (+) Anxiety associated with depression   (+) Bipolar depression (Multi)      GI   (+) GERD (gastroesophageal reflux disease)      Musculoskeletal   (+) Fibromyalgia   (+) Osteoarthritis of left glenohumeral joint      ID   (+) Tinea capitis       Clinical information reviewed:   Tobacco  Allergies  Meds   Med Hx  Surg Hx  OB Status  Fam Hx  Soc   Hx        NPO Detail:  NPO/Void Status  Date of Last Liquid: 04/18/24  Time of Last Liquid: 1830  Date of Last Solid: 04/18/24  Time of Last Solid: 1830         Physical Exam    Airway  Mallampati: II  TM distance: >3 FB  Neck ROM: full     Cardiovascular - normal exam     Dental   (+) upper dentures, lower dentures     Pulmonary - normal exam     Abdominal - normal exam         Anesthesia Plan    History of general anesthesia?: yes  History of complications of general anesthesia?: no    ASA 3     MAC     The patient is a current smoker.  Patient was previously instructed to abstain from smoking on day of procedure.  Patient did not smoke on day of procedure.    intravenous induction   Postoperative administration of opioids is intended.  Anesthetic plan and risks discussed with patient.  Use of blood products discussed with patient who.    Plan discussed with CRNA.

## 2024-04-19 NOTE — DISCHARGE INSTRUCTIONS
Avoid lifting greater than 15 lbs till follow up with surgeon. At your follow up sutures will be removed. Leave dressings in place for 2 days. OK to shower.

## 2024-04-19 NOTE — ANESTHESIA POSTPROCEDURE EVALUATION
Patient: Renu Patel    Procedure Summary       Date: 04/19/24 Room / Location: [facility] / [facility]    Anesthesia Start: 1101 Anesthesia Stop: 1153    Procedures:       EXCISION RIGHT DELTOID SUBCUTANEOUS MASS and PUNCH BIOPSY OF LEFT BACK CUTANEOUS LESION 3CM (Right: Shoulder)      . (Left: Back) Diagnosis:       Other shoulder lesions, right shoulder      Back skin lesion      Burning sensation      (Other shoulder lesions, right shoulder [M75.81])      (Back skin lesion [L98.9])      (Burning sensation [R20.8])    Surgeons: Ricky Brooks MD Responsible Provider: WENDY Helton    Anesthesia Type: MAC ASA Status: 3            Anesthesia Type: MAC    Vitals Value Taken Time   /85 04/19/24 1220   Temp 36.5 °C (97.7 °F) 04/19/24 1149   Pulse 66 04/19/24 1219   Resp 22 04/19/24 1219   SpO2 97 % 04/19/24 1219   Vitals shown include unfiled device data.    Anesthesia Post Evaluation    Patient location during evaluation: PACU  Patient participation: complete - patient participated  Level of consciousness: awake  Pain score: 0  Pain management: adequate  Airway patency: patent  Cardiovascular status: acceptable  Respiratory status: acceptable  Hydration status: acceptable  Postoperative Nausea and Vomiting: none    There were no known notable events for this encounter.

## 2024-04-19 NOTE — OP NOTE
EXCISION RIGHT DELTOID SUBCUTANEOUS MASS and PUNCH BIOPSY OF LEFT BACK CUTANEOUS LESION 3CM (R), . (L) Operative Note     Date: 2024  OR Location: [facility]    Name: Renu Patel, : 1955, Age: 68 y.o., MRN: [id number], Sex: female    Diagnosis  Pre-op Diagnosis     * Other shoulder lesions, right shoulder [M75.81]     * Back skin lesion [L98.9]     * Burning sensation [R20.8] Post-op Diagnosis     * Other shoulder lesions, right shoulder [M75.81]     * Back skin lesion [L98.9]     * Burning sensation [R20.8]     Procedures  EXCISION RIGHT DELTOID SUBCUTANEOUS MASS and PUNCH BIOPSY OF LEFT BACK CUTANEOUS LESION 3CM  80150 - DC PUNCH BIOPSY SKIN SINGLE LESION    .  71270 - DC PUNCH BIOPSY SKIN SINGLE LESION     deltoid lesion total 5 cm  Surgeons      * Ricky Brooks - Primary    Resident/Fellow/Other Assistant:  Surgeons and Role:  * No surgeons found with a matching role *    Procedure Summary  Anesthesia: Monitor Anesthesia Care  ASA: III  Anesthesia Staff: CRNA: KEI Helton-CRNA  Estimated Blood Loss: 3mL  Intra-op Medications:   Administrations occurring from 1030 to 1200 on 24:   Medication Name Total Dose   lidocaine-epinephrine (Xylocaine W/EPI) 1 %-1:100,000 injection 5 mL   BUPivacaine HCl (Marcaine) 0.5 % (5 mg/mL) injection 5 mL   lactated Ringer's infusion Cannot be calculated              Anesthesia Record               Intraprocedure I/O Totals          Intake    lactated Ringer's infusion 400.00 mL    Total Intake 400 mL          Specimen:   ID Type Source Tests Collected by Time   1 : LIPOMA OF RIGHT DELTOID Tissue LIPOMA SURGICAL PATHOLOGY EXAM Ricky Brooks MD 2024 1131   2 : LEFT BACK PUNCH BIOPSY Tissue SKIN PUNCH BIOPSY SURGICAL PATHOLOGY EXAM Ricky Brooks MD 2024 1136        Staff:   Circulator: Onelia Vasquez RN; Shakira Hickey RN  Relief Circulator: Mignon Sy RN  Scrub Person: Carlota Johnson; Mary Jane Lake         Drains and/or Catheters: * None in log  *    Tourniquet Times:         Implants:     Findings:       Indications: Renu Patel is an 68 y.o. female who is having surgery for Other shoulder lesions, right shoulder [M75.81]  Back skin lesion [L98.9]  Burning sensation [R20.8].       The patient was seen in the preoperative area. The risks, benefits, complications, treatment options, non-operative alternatives, expected recovery and outcomes were discussed with the patient. The possibilities of reaction to medication, pulmonary aspiration, injury to surrounding structures, bleeding, recurrent infection, the need for additional procedures, failure to diagnose a condition, and creating a complication requiring transfusion or operation were discussed with the patient. The patient concurred with the proposed plan, giving informed consent.  The site of surgery was properly noted/marked if necessary per policy. The patient has been actively warmed in preoperative area. Preoperative antibiotics are not indicated. Venous thrombosis prophylaxis have been ordered including bilateral sequential compression devices    Procedure Details:   Patient has a history of non-Hodgkin's lymphoma which she told me presented as a lesion on her skin she has some discoloration on her back that she is suspicious of in addition she noted to subcutaneous lesions in the area of the deltoid consistent with lipomas    Patient was brought to the operating room placed in supine position sedated her right arm was brought across her chest which presented the deltoid region which was prepped and draped and then an incision was made carried down to the subcutaneous tissue and 2 separate lipomas were removed from the same incision total 5 cm.  At this point this wound was irrigated and closed with nylon local anesthesia was instilled the wound was dressed patient was then turned somewhat to the right which the discoloration on the back presented itself area was prepped and 2 punch biopsies  of 5 mm were made in the skin to rule out lymphoma these wounds were closed with nylon dressed with bacitracin and 2 x 2 she tolerated the procedure well and was returned to the recovery room in satisfactory condition  Complications:  None; patient tolerated the procedure well.    Disposition: PACU - hemodynamically stable.  Condition: stable         Additional Details:       Attending Attestation: I was present for the entire procedure.    Ricky Brooks  Phone Number: [phone]

## 2024-05-02 LAB
LABORATORY COMMENT REPORT: NORMAL
PATH REPORT.FINAL DX SPEC: NORMAL
PATH REPORT.GROSS SPEC: NORMAL
PATH REPORT.RELEVANT HX SPEC: NORMAL
PATH REPORT.TOTAL CANCER: NORMAL

## 2024-05-14 ENCOUNTER — ANESTHESIA EVENT (OUTPATIENT)
Dept: GASTROENTEROLOGY | Facility: HOSPITAL | Age: 69
End: 2024-05-14
Payer: COMMERCIAL

## 2024-05-15 ENCOUNTER — ANESTHESIA (OUTPATIENT)
Dept: GASTROENTEROLOGY | Facility: HOSPITAL | Age: 69
End: 2024-05-15
Payer: COMMERCIAL

## 2024-05-15 ENCOUNTER — HOSPITAL ENCOUNTER (OUTPATIENT)
Dept: GASTROENTEROLOGY | Facility: HOSPITAL | Age: 69
Discharge: HOME | End: 2024-05-15
Payer: COMMERCIAL

## 2024-05-15 VITALS
RESPIRATION RATE: 16 BRPM | HEART RATE: 62 BPM | DIASTOLIC BLOOD PRESSURE: 78 MMHG | SYSTOLIC BLOOD PRESSURE: 128 MMHG | TEMPERATURE: 97.7 F | OXYGEN SATURATION: 94 %

## 2024-05-15 DIAGNOSIS — Z12.11 COLON CANCER SCREENING: Primary | ICD-10-CM

## 2024-05-15 PROBLEM — J45.909 ASTHMA (HHS-HCC): Status: ACTIVE | Noted: 2024-05-15

## 2024-05-15 PROBLEM — I63.9 CVA (CEREBRAL VASCULAR ACCIDENT) (MULTI): Status: ACTIVE | Noted: 2024-05-15

## 2024-05-15 PROBLEM — R01.1 MURMUR: Status: ACTIVE | Noted: 2024-05-15

## 2024-05-15 PROCEDURE — G0121 COLON CA SCRN NOT HI RSK IND: HCPCS | Performed by: INTERNAL MEDICINE

## 2024-05-15 PROCEDURE — 2500000004 HC RX 250 GENERAL PHARMACY W/ HCPCS (ALT 636 FOR OP/ED): Performed by: NURSE ANESTHETIST, CERTIFIED REGISTERED

## 2024-05-15 PROCEDURE — 3700000002 HC GENERAL ANESTHESIA TIME - EACH INCREMENTAL 1 MINUTE

## 2024-05-15 PROCEDURE — 45378 DIAGNOSTIC COLONOSCOPY: CPT | Performed by: INTERNAL MEDICINE

## 2024-05-15 PROCEDURE — 7100000009 HC PHASE TWO TIME - INITIAL BASE CHARGE

## 2024-05-15 PROCEDURE — 2500000005 HC RX 250 GENERAL PHARMACY W/O HCPCS: Performed by: NURSE ANESTHETIST, CERTIFIED REGISTERED

## 2024-05-15 PROCEDURE — 7100000010 HC PHASE TWO TIME - EACH INCREMENTAL 1 MINUTE

## 2024-05-15 PROCEDURE — 3700000001 HC GENERAL ANESTHESIA TIME - INITIAL BASE CHARGE

## 2024-05-15 RX ORDER — LIDOCAINE HYDROCHLORIDE 20 MG/ML
INJECTION, SOLUTION EPIDURAL; INFILTRATION; INTRACAUDAL; PERINEURAL AS NEEDED
Status: DISCONTINUED | OUTPATIENT
Start: 2024-05-15 | End: 2024-05-15

## 2024-05-15 RX ORDER — SODIUM CHLORIDE 9 MG/ML
INJECTION, SOLUTION INTRAVENOUS CONTINUOUS PRN
Status: DISCONTINUED | OUTPATIENT
Start: 2024-05-15 | End: 2024-05-15

## 2024-05-15 RX ORDER — FENTANYL CITRATE 50 UG/ML
INJECTION, SOLUTION INTRAMUSCULAR; INTRAVENOUS AS NEEDED
Status: DISCONTINUED | OUTPATIENT
Start: 2024-05-15 | End: 2024-05-15

## 2024-05-15 RX ORDER — SODIUM CHLORIDE 9 MG/ML
20 INJECTION, SOLUTION INTRAVENOUS CONTINUOUS
Status: CANCELLED | OUTPATIENT
Start: 2024-05-15

## 2024-05-15 RX ORDER — PROPOFOL 10 MG/ML
INJECTION, EMULSION INTRAVENOUS AS NEEDED
Status: DISCONTINUED | OUTPATIENT
Start: 2024-05-15 | End: 2024-05-15

## 2024-05-15 RX ADMIN — PROPOFOL 70 MG: 10 INJECTION, EMULSION INTRAVENOUS at 08:37

## 2024-05-15 RX ADMIN — FENTANYL CITRATE 25 MCG: 50 INJECTION INTRAMUSCULAR; INTRAVENOUS at 08:40

## 2024-05-15 RX ADMIN — FENTANYL CITRATE 25 MCG: 50 INJECTION INTRAMUSCULAR; INTRAVENOUS at 08:48

## 2024-05-15 RX ADMIN — FENTANYL CITRATE 50 MCG: 50 INJECTION INTRAMUSCULAR; INTRAVENOUS at 08:37

## 2024-05-15 RX ADMIN — SODIUM CHLORIDE: 9 INJECTION, SOLUTION INTRAVENOUS at 08:34

## 2024-05-15 RX ADMIN — LIDOCAINE HYDROCHLORIDE 40 MG: 20 INJECTION, SOLUTION EPIDURAL; INFILTRATION; INTRACAUDAL; PERINEURAL at 08:37

## 2024-05-15 RX ADMIN — PROPOFOL 50 MG: 10 INJECTION, EMULSION INTRAVENOUS at 08:44

## 2024-05-15 RX ADMIN — PROPOFOL 30 MG: 10 INJECTION, EMULSION INTRAVENOUS at 08:48

## 2024-05-15 RX ADMIN — PROPOFOL 50 MG: 10 INJECTION, EMULSION INTRAVENOUS at 08:40

## 2024-05-15 SDOH — HEALTH STABILITY: MENTAL HEALTH: CURRENT SMOKER: 1

## 2024-05-15 ASSESSMENT — PAIN - FUNCTIONAL ASSESSMENT
PAIN_FUNCTIONAL_ASSESSMENT: 0-10

## 2024-05-15 ASSESSMENT — PAIN DESCRIPTION - DESCRIPTORS: DESCRIPTORS: ACHING

## 2024-05-15 ASSESSMENT — COLUMBIA-SUICIDE SEVERITY RATING SCALE - C-SSRS
2. HAVE YOU ACTUALLY HAD ANY THOUGHTS OF KILLING YOURSELF?: NO
1. IN THE PAST MONTH, HAVE YOU WISHED YOU WERE DEAD OR WISHED YOU COULD GO TO SLEEP AND NOT WAKE UP?: NO

## 2024-05-15 ASSESSMENT — PAIN SCALES - GENERAL
PAINLEVEL_OUTOF10: 0 - NO PAIN
PAINLEVEL_OUTOF10: 0 - NO PAIN
PAINLEVEL_OUTOF10: 8
PAIN_LEVEL: 0
PAINLEVEL_OUTOF10: 0 - NO PAIN

## 2024-05-15 NOTE — H&P
History Of Present Illness  Renu Patel is a 68 y.o. female presenting for colon cancer screening examination.  Her prior colonoscopy several years ago was remarkable only for diverticulosis coli and internal hemorrhoids.  There is no family history of colon cancer or polyp..     Past Medical History  She has a past medical history of Asthma ([facility]), Contact dermatitis (06/21/2023), COPD (chronic obstructive pulmonary disease) (Multi), Diarrhea (06/21/2023), Full dentures, Grief (06/21/2023), Hyperlipidemia, Hypertension, Personal history of non-Hodgkin lymphomas, Personal history of other diseases of the musculoskeletal system and connective tissue, Personal history of other diseases of the musculoskeletal system and connective tissue, Personal history of other diseases of the musculoskeletal system and connective tissue, Personal history of other mental and behavioral disorders, Personal history of other mental and behavioral disorders, Personal history of other specified conditions, Personal history of transient ischemic attack (TIA), and cerebral infarction without residual deficits, PTSD (post-traumatic stress disorder), and Vision loss.    Surgical History  She has a past surgical history that includes Eye surgery (06/28/2017); Knee surgery (06/28/2017); Other surgical history (06/28/2017); Hysterectomy (06/28/2017); Other surgical history (06/28/2017); and Other surgical history (10/29/2021).     Social History  She reports that she has never smoked. She has never used smokeless tobacco. She reports that she does not currently use alcohol. She reports current drug use. Drug: Marijuana.    Family History  Family History   Problem Relation Name Age of Onset    Cancer Mother      Heart disease Father      Stroke Father      Heart attack Father      Cancer Father's Sister          Allergies  Adhesive tape-silicones, Codeine, Meloxicam, Meperidine, Morphine, Oxycodone, Oxycodone-acetaminophen, Quetiapine,  Sulfadoxine, Tizanidine, Topiramate, and Trazodone    Review of Systems  Constitutional: no fever, no chills, fatigue,  not feeling tired and no recent weight loss. No reports of dizziness.  SKIN: No skin rash or lesions  EYE: No pain photophobia, diplopia or blurred vision  EAR: No discharge, pain or hearing loss  NOSE: No congestion, epistaxis or obstruction  RESPIRATORY: No cough , dyspnea or  chest pain   CV: No chest pain, lower extremity swelling or palpitations  GE: No abdominal pain, nausea, vomiting, dysphagia or odynophagia. Denied constipation , diarrhea  : No dysuria or hematuria, urinary incontinence or urine frequency  NEURO: Denied weakness, confusion, dizziness, or numbness   PSYCH : Denies anxiety, hallucinations or insomnia  HEME/ LYMPH : Denied bleeding , bruising or enlarged nodes  ENDOCRINE: No change in weight, polydipsia, or abnormal bleeding  .     Physical Exam  Head and neck examinations were  unremarkable. There was no temporal wasting. No jaundice noted. No thyromegaly.  No carotid bruits.  There is no peripheral lymphadenopathy.  Lungs were clear to auscultation. There when no rales, rhonchi or wheezes.  Cardiac examination revealed normal heart sounds. Rhythm was sinus . There were no murmurs.  Abdomen was full and soft. There was no organomegaly. Bowel sounds were normo- active. There was no ascites. The abdomen was nontender.  Rectal examination was not done.  Extremities: No edema or joint swelling.  Neurological examination: Patient was alert, oriented in person place, and time. There when no focal neurological signs. Cranial nerves were grossly intact. No evidence of encephalopathy. There was no asterixis. The  plantar response was flexor.    Last Recorded Vitals  Blood pressure 178/90, pulse 75, temperature 37 °C (98.6 °F), temperature source Temporal, resp. rate 18, SpO2 94%.    Relevant Results             Assessment/Plan  68-year-old lady presenting for colon cancer screening  examination.  She is asymptomatic.  Her physical examination was unremarkable.  Will proceed with colonoscopy as planned.  Alden Keane MD

## 2024-05-15 NOTE — ANESTHESIA PREPROCEDURE EVALUATION
Patient: Renu Patel    Procedure Information       Date/Time: 05/15/24 0800    Scheduled providers: Alden Keane MD    Procedure: COLONOSCOPY    [facility]            Relevant Problems   Anesthesia (within normal limits)      Cardiac   (+) Essential hypertension   (+) Hyperlipidemia   (+) Murmur      Pulmonary   (+) Asthma (HHS-HCC)   (+) Chronic obstructive pulmonary disease (Multi)      Neuro   (+) Anxiety associated with depression   (+) Bipolar depression (Multi)   (+) CVA (cerebral vascular accident) (Multi) (2003 no residuals)      GI   (+) GERD (gastroesophageal reflux disease)      /Renal (within normal limits)      Liver (within normal limits)      Endocrine (within normal limits)      Hematology (within normal limits)      Musculoskeletal   (+) Fibromyalgia   (+) Osteoarthritis of left glenohumeral joint      ID   (+) Tinea capitis       Clinical information reviewed:   Tobacco  Allergies  Meds   Med Hx  Surg Hx  OB Status  Fam Hx  Soc   Hx        NPO Detail:  NPO/Void Status  Carbohydrate Drink Given Prior to Surgery? : N  Date of Last Liquid: 05/14/24  Time of Last Liquid: 2200  Date of Last Solid: 05/15/24  Time of Last Solid: 1130  Last Intake Type: Clear fluids  Time of Last Void: 0630         Physical Exam    Airway  Mallampati: II  TM distance: >3 FB  Neck ROM: full     Cardiovascular - normal exam  Rhythm: regular     Dental   (+) upper dentures     Pulmonary - normal exam     Abdominal - normal exam         Anesthesia Plan    History of general anesthesia?: yes  History of complications of general anesthesia?: no    ASA 3     MAC     The patient is a current smoker.  Patient was previously instructed to abstain from smoking on day of procedure.  Patient smoked on day of procedure.    intravenous induction   Anesthetic plan and risks discussed with patient.

## 2024-05-15 NOTE — ANESTHESIA POSTPROCEDURE EVALUATION
Patient: Renu Patel    Procedure Summary       Date: 05/15/24 Room / Location: [facility]    Anesthesia Start: 0832 Anesthesia Stop: 0902    Procedure: COLONOSCOPY Diagnosis: Colon cancer screening    Scheduled Providers: Alden Keane MD Responsible Provider: WENDY Barnard    Anesthesia Type: MAC ASA Status: 3            Anesthesia Type: MAC    Vitals Value Taken Time   /57 05/15/24 0902   Temp 36.5 °C (97.7 °F) 05/15/24 0902   Pulse 68 05/15/24 0902   Resp 16 05/15/24 0902   SpO2 95 % 05/15/24 0902       Anesthesia Post Evaluation    Patient location during evaluation: bedside  Patient participation: complete - patient participated  Level of consciousness: awake and alert  Pain score: 0  Pain management: adequate  Airway patency: patent  Cardiovascular status: acceptable  Respiratory status: acceptable  Hydration status: acceptable  Postoperative Nausea and Vomiting: none    There were no known notable events for this encounter.

## 2024-06-11 DIAGNOSIS — Z59.48 FOOD DEPRIVATION: ICD-10-CM

## 2024-06-11 SDOH — ECONOMIC STABILITY: FOOD INSECURITY: WITHIN THE PAST 12 MONTHS, YOU WORRIED THAT YOUR FOOD WOULD RUN OUT BEFORE YOU GOT MONEY TO BUY MORE.: SOMETIMES TRUE

## 2024-06-11 SDOH — ECONOMIC STABILITY: FOOD INSECURITY: WITHIN THE PAST 12 MONTHS, THE FOOD YOU BOUGHT JUST DIDN'T LAST AND YOU DIDN'T HAVE MONEY TO GET MORE.: SOMETIMES TRUE

## 2024-06-11 SDOH — ECONOMIC STABILITY - FOOD INSECURITY: OTHER SPECIFIED LACK OF ADEQUATE FOOD: Z59.48

## 2024-06-24 ENCOUNTER — APPOINTMENT (OUTPATIENT)
Dept: PRIMARY CARE | Facility: CLINIC | Age: 69
End: 2024-06-24
Payer: COMMERCIAL

## 2024-07-12 DIAGNOSIS — E78.2 MIXED HYPERLIPIDEMIA: ICD-10-CM

## 2024-07-12 DIAGNOSIS — K21.9 GASTROESOPHAGEAL REFLUX DISEASE WITHOUT ESOPHAGITIS: ICD-10-CM

## 2024-07-12 DIAGNOSIS — I10 PRIMARY HYPERTENSION: ICD-10-CM

## 2024-07-12 RX ORDER — OMEPRAZOLE 20 MG/1
20 CAPSULE, DELAYED RELEASE ORAL NIGHTLY
Qty: 90 CAPSULE | Refills: 1 | Status: SHIPPED | OUTPATIENT
Start: 2024-07-12 | End: 2025-01-08

## 2024-07-12 RX ORDER — LISINOPRIL 30 MG/1
30 TABLET ORAL DAILY
Qty: 90 TABLET | Refills: 1 | Status: SHIPPED | OUTPATIENT
Start: 2024-07-12 | End: 2025-01-08

## 2024-07-12 RX ORDER — ATORVASTATIN CALCIUM 40 MG/1
40 TABLET, FILM COATED ORAL NIGHTLY
Qty: 90 TABLET | Refills: 1 | Status: SHIPPED | OUTPATIENT
Start: 2024-07-12 | End: 2025-01-08

## 2024-07-12 NOTE — TELEPHONE ENCOUNTER
Med Refill   lisinopril 30 mg tablet [[id number]]     atorvastatin (Lipitor) 40 mg tablet [[id number]]     omeprazole (PriLOSEC) 20 mg DR capsule [[id number]]       [id number] - [facility] [address]  [address] OF [address] [address]  Phone: [phone]  Fax: [phone]  CHAD #: [id number]     LOV: 23    NOV: 24

## 2024-07-16 ENCOUNTER — CLINICAL SUPPORT (OUTPATIENT)
Dept: NUTRITION | Facility: HOSPITAL | Age: 69
End: 2024-07-16
Payer: COMMERCIAL

## 2024-07-16 DIAGNOSIS — Z59.48 FOOD DEPRIVATION: ICD-10-CM

## 2024-07-16 SDOH — ECONOMIC STABILITY - FOOD INSECURITY: OTHER SPECIFIED LACK OF ADEQUATE FOOD: Z59.48

## 2024-07-16 NOTE — PROGRESS NOTES
[employer]  Diet Recommendation 1: Sodium, Low  Diet Recommendation 2: Heart Healthy  Household Size: 1 Family Member  Interventions: Referral Number: 3rd 6 Mo Referral 1.5 yrs (Referrals may not be consecutive)  Interventions: Visit Number: 1 of 6 Visits - Max 6 Visits/Referral Each 6 Mo Period  Education Today: [employer] Meals  Grains: 0-25% Whole  Fruit: 50-75% Fresh  Vegetables: 50-75% Fresh  Proteins: 0 Plant-based Items  Dairy: 0-25% Lowfat  Initials of RD Assisting Today:

## 2024-08-20 ENCOUNTER — APPOINTMENT (OUTPATIENT)
Dept: NUTRITION | Facility: HOSPITAL | Age: 69
End: 2024-08-20
Payer: COMMERCIAL

## 2024-09-11 ENCOUNTER — APPOINTMENT (OUTPATIENT)
Dept: PRIMARY CARE | Facility: CLINIC | Age: 69
End: 2024-09-11
Payer: COMMERCIAL

## 2024-09-11 VITALS
SYSTOLIC BLOOD PRESSURE: 130 MMHG | HEART RATE: 69 BPM | HEIGHT: 62 IN | BODY MASS INDEX: 29.08 KG/M2 | WEIGHT: 158 LBS | DIASTOLIC BLOOD PRESSURE: 64 MMHG

## 2024-09-11 DIAGNOSIS — E78.2 MIXED HYPERLIPIDEMIA: ICD-10-CM

## 2024-09-11 DIAGNOSIS — R73.03 PREDIABETES: ICD-10-CM

## 2024-09-11 DIAGNOSIS — Z78.0 POST-MENOPAUSAL: Primary | ICD-10-CM

## 2024-09-11 DIAGNOSIS — J44.9 CHRONIC OBSTRUCTIVE PULMONARY DISEASE, UNSPECIFIED COPD TYPE (MULTI): ICD-10-CM

## 2024-09-11 DIAGNOSIS — I10 PRIMARY HYPERTENSION: ICD-10-CM

## 2024-09-11 DIAGNOSIS — I10 ESSENTIAL HYPERTENSION: ICD-10-CM

## 2024-09-11 DIAGNOSIS — Z12.31 VISIT FOR SCREENING MAMMOGRAM: ICD-10-CM

## 2024-09-11 DIAGNOSIS — Z00.00 MEDICARE ANNUAL WELLNESS VISIT, SUBSEQUENT: ICD-10-CM

## 2024-09-11 DIAGNOSIS — M54.50 LUMBAR PAIN: ICD-10-CM

## 2024-09-11 DIAGNOSIS — F31.9 BIPOLAR DEPRESSION (MULTI): ICD-10-CM

## 2024-09-11 DIAGNOSIS — K21.9 GASTROESOPHAGEAL REFLUX DISEASE WITHOUT ESOPHAGITIS: ICD-10-CM

## 2024-09-11 DIAGNOSIS — Z12.11 COLON CANCER SCREENING: ICD-10-CM

## 2024-09-11 PROCEDURE — 3078F DIAST BP <80 MM HG: CPT | Performed by: CLINICAL NURSE SPECIALIST

## 2024-09-11 PROCEDURE — 3075F SYST BP GE 130 - 139MM HG: CPT | Performed by: CLINICAL NURSE SPECIALIST

## 2024-09-11 PROCEDURE — 1124F ACP DISCUSS-NO DSCNMKR DOCD: CPT | Performed by: CLINICAL NURSE SPECIALIST

## 2024-09-11 PROCEDURE — G0444 DEPRESSION SCREEN ANNUAL: HCPCS | Performed by: CLINICAL NURSE SPECIALIST

## 2024-09-11 PROCEDURE — 1036F TOBACCO NON-USER: CPT | Performed by: CLINICAL NURSE SPECIALIST

## 2024-09-11 PROCEDURE — G0439 PPPS, SUBSEQ VISIT: HCPCS | Performed by: CLINICAL NURSE SPECIALIST

## 2024-09-11 PROCEDURE — 3008F BODY MASS INDEX DOCD: CPT | Performed by: CLINICAL NURSE SPECIALIST

## 2024-09-11 PROCEDURE — 1159F MED LIST DOCD IN RCRD: CPT | Performed by: CLINICAL NURSE SPECIALIST

## 2024-09-11 PROCEDURE — 99214 OFFICE O/P EST MOD 30 MIN: CPT | Performed by: CLINICAL NURSE SPECIALIST

## 2024-09-11 PROCEDURE — 1160F RVW MEDS BY RX/DR IN RCRD: CPT | Performed by: CLINICAL NURSE SPECIALIST

## 2024-09-11 PROCEDURE — 1170F FXNL STATUS ASSESSED: CPT | Performed by: CLINICAL NURSE SPECIALIST

## 2024-09-11 RX ORDER — LISINOPRIL 30 MG/1
30 TABLET ORAL DAILY
Qty: 90 TABLET | Refills: 3 | Status: SHIPPED | OUTPATIENT
Start: 2024-09-11 | End: 2025-09-06

## 2024-09-11 RX ORDER — CYCLOBENZAPRINE HCL 10 MG
10 TABLET ORAL DAILY
Qty: 90 TABLET | Refills: 0 | Status: SHIPPED | OUTPATIENT
Start: 2024-09-11 | End: 2024-12-10

## 2024-09-11 RX ORDER — OMEPRAZOLE 20 MG/1
20 CAPSULE, DELAYED RELEASE ORAL NIGHTLY
Qty: 90 CAPSULE | Refills: 3 | Status: SHIPPED | OUTPATIENT
Start: 2024-09-11 | End: 2025-09-06

## 2024-09-11 RX ORDER — ATORVASTATIN CALCIUM 40 MG/1
40 TABLET, FILM COATED ORAL NIGHTLY
Qty: 90 TABLET | Refills: 3 | Status: SHIPPED | OUTPATIENT
Start: 2024-09-11 | End: 2025-09-06

## 2024-09-11 ASSESSMENT — ENCOUNTER SYMPTOMS
UNEXPECTED WEIGHT CHANGE: 0
DYSURIA: 0
BACK PAIN: 0
NECK PAIN: 0
BLOOD IN STOOL: 0
DEPRESSION: 0
LOSS OF SENSATION IN FEET: 0
VOMITING: 0
CONSTIPATION: 0
DIZZINESS: 0
SHORTNESS OF BREATH: 0
CHEST TIGHTNESS: 0
FATIGUE: 0
ABDOMINAL PAIN: 0
CHILLS: 0
WOUND: 0
COUGH: 0
EYE PAIN: 0
WHEEZING: 0
DIARRHEA: 0
OCCASIONAL FEELINGS OF UNSTEADINESS: 0
CONFUSION: 0
SLEEP DISTURBANCE: 0
SORE THROAT: 0
POLYDIPSIA: 0
HEADACHES: 0
FLANK PAIN: 0
MYALGIAS: 0
JOINT SWELLING: 0
NAUSEA: 0
APPETITE CHANGE: 0
ACTIVITY CHANGE: 0
ARTHRALGIAS: 0
BRUISES/BLEEDS EASILY: 0
PALPITATIONS: 0
SEIZURES: 0
TROUBLE SWALLOWING: 0
HEMATURIA: 0
PHOTOPHOBIA: 0
FEVER: 0

## 2024-09-11 ASSESSMENT — PATIENT HEALTH QUESTIONNAIRE - PHQ9
SUM OF ALL RESPONSES TO PHQ9 QUESTIONS 1 AND 2: 0
2. FEELING DOWN, DEPRESSED OR HOPELESS: NOT AT ALL
1. LITTLE INTEREST OR PLEASURE IN DOING THINGS: NOT AT ALL

## 2024-09-11 ASSESSMENT — ACTIVITIES OF DAILY LIVING (ADL)
BATHING: INDEPENDENT
MANAGING_FINANCES: INDEPENDENT
DRESSING: INDEPENDENT
GROCERY_SHOPPING: INDEPENDENT
DOING_HOUSEWORK: INDEPENDENT
TAKING_MEDICATION: INDEPENDENT

## 2024-09-11 NOTE — ASSESSMENT & PLAN NOTE
Orders:    Follow Up In [facility] - PCP - Medicare Annual    Comprehensive Metabolic Panel; Future    Lipid Panel; Future    CBC; Future    Vitamin B12; Future    TSH with reflex to Free T4 if abnormal; Future    Hemoglobin A1C; Future    Hepatitis C antibody; Future    Vitamin D 25-Hydroxy,Total (for eval of Vitamin D levels); Future    Follow Up In [facility] - PCP - Medicare Annual; Future

## 2024-09-11 NOTE — ASSESSMENT & PLAN NOTE
Orders:    Follow Up In [facility] - [facility] - Medicare Annual    atorvastatin (Lipitor) 40 mg tablet; Take 1 tablet (40 mg) by mouth once daily at bedtime.    Comprehensive Metabolic Panel; Future    Lipid Panel; Future    CBC; Future    Vitamin B12; Future    TSH with reflex to Free T4 if abnormal; Future    Hemoglobin A1C; Future    Hepatitis C antibody; Future    Vitamin D 25-Hydroxy,Total (for eval of Vitamin D levels); Future    Follow Up In [facility] - PCP - Medicare Annual; Future

## 2024-09-11 NOTE — ASSESSMENT & PLAN NOTE
Orders:    Follow Up In [facility] - PCP - Medicare Annual    Comprehensive Metabolic Panel; Future    Lipid Panel; Future    CBC; Future    Vitamin B12; Future    TSH with reflex to Free T4 if abnormal; Future    Hemoglobin A1C; Future    Hepatitis C antibody; Future    Vitamin D 25-Hydroxy,Total (for eval of Vitamin D levels); Future

## 2024-09-11 NOTE — ASSESSMENT & PLAN NOTE
Orders:    omeprazole (PriLOSEC) 20 mg DR capsule; Take 1 capsule (20 mg) by mouth once daily at bedtime.    Comprehensive Metabolic Panel; Future    Lipid Panel; Future    CBC; Future    Vitamin B12; Future    TSH with reflex to Free T4 if abnormal; Future    Hemoglobin A1C; Future    Hepatitis C antibody; Future    Vitamin D 25-Hydroxy,Total (for eval of Vitamin D levels); Future    Follow Up In [facility] - PCP - Medicare Annual; Future

## 2024-09-11 NOTE — PROGRESS NOTES
Subjective   Reason for Visit: Renu Patel is an 69 y.o. female here for a Medicare Wellness visit.     Past Medical, Surgical, and Family History reviewed and updated in chart.    Reviewed all medications by prescribing practitioner or clinical pharmacist (such as prescriptions, OTCs, herbal therapies and supplements) and documented in the medical record.    HPI    Here today as a follow up appointment. Due for Medicare Wellness.      Follows with Dr. Marion White. Has been on Lexapro. Bipolar Depression and PTSD. Hydroxyzine PRN.      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 Previously followed with Dr. Cochran.      Has continued medications as prescribed.     Patient Care Team:  [facility] as PCP - General (Internal Medicine)  Ernestina Montes De Oca MD as PCP - [facility] [employer] PCP  Stephen Schultz MD (Psychiatry)     Review of Systems   Constitutional:  Negative for activity change, appetite change, chills, fatigue, fever and unexpected weight change.   HENT:  Negative for ear pain, hearing loss, nosebleeds, sore throat, tinnitus and trouble swallowing.    Eyes:  Negative for photophobia, pain and visual disturbance.   Respiratory:  Negative for cough, chest tightness, shortness of breath and wheezing.    Cardiovascular:  Negative for chest pain, palpitations and leg swelling.   Gastrointestinal:  Negative for abdominal pain, blood in stool, constipation, diarrhea, nausea and vomiting.   Endocrine: Negative for cold intolerance, heat intolerance, polydipsia and polyuria.   Genitourinary:  Negative for dysuria, flank pain and hematuria.   Musculoskeletal:  Negative for arthralgias, back pain, joint swelling, myalgias and neck pain.   Skin:  Negative for pallor, rash and wound.   Allergic/Immunologic: Negative for immunocompromised state.   Neurological:  Negative for dizziness, seizures and headaches.   Hematological:  Does not bruise/bleed easily.   Psychiatric/Behavioral:  Negative for  "confusion and sleep disturbance.        Objective   Vitals:  /64 (BP Location: Right arm)   Pulse 69   Ht 1.575 m (5' 2\")   Wt 71.7 kg (158 lb)   BMI 28.90 kg/m²       Physical Exam  Vitals and nursing note reviewed.   Constitutional:       General: She is not in acute distress.     Appearance: Normal appearance.   HENT:      Head: Normocephalic.      Nose: Nose normal.   Eyes:      Conjunctiva/sclera: Conjunctivae normal.   Neck:      Vascular: No carotid bruit.   Cardiovascular:      Rate and Rhythm: Normal rate and regular rhythm.      Pulses: Normal pulses.      Heart sounds: Normal heart sounds.   Pulmonary:      Effort: Pulmonary effort is normal.      Breath sounds: Normal breath sounds.   Abdominal:      General: Bowel sounds are normal.      Palpations: Abdomen is soft.   Musculoskeletal:         General: Normal range of motion.      Cervical back: Normal range of motion.   Skin:     General: Skin is warm and dry.   Neurological:      Mental Status: She is alert and oriented to person, place, and time. Mental status is at baseline.   Psychiatric:         Mood and Affect: Mood normal.         Behavior: Behavior normal.       Assessment & Plan  Medicare annual wellness visit, subsequent    Orders:    Follow Up In [facility] - PCP - Medicare Annual    Comprehensive Metabolic Panel; Future    Lipid Panel; Future    CBC; Future    Vitamin B12; Future    TSH with reflex to Free T4 if abnormal; Future    Hemoglobin A1C; Future    Hepatitis C antibody; Future    Vitamin D 25-Hydroxy,Total (for eval of Vitamin D levels); Future    Follow Up In [facility] - PCP - Medicare Annual; Future    Visit for screening mammogram    Orders:    Follow Up In [facility] - PCP - Medicare Annual    Comprehensive Metabolic Panel; Future    Lipid Panel; Future    CBC; Future    Vitamin B12; Future    TSH with reflex to Free T4 if abnormal; Future    Hemoglobin A1C; Future    Hepatitis C " antibody; Future    Vitamin D 25-Hydroxy,Total (for eval of Vitamin D levels); Future    Follow Up In [facility] - PCP - Medicare Annual; Future    BI mammo bilateral screening tomosynthesis; Future    Colon cancer screening    Orders:    Follow Up In [facility] - PCP - Medicare Annual    Comprehensive Metabolic Panel; Future    Lipid Panel; Future    CBC; Future    Vitamin B12; Future    TSH with reflex to Free T4 if abnormal; Future    Hemoglobin A1C; Future    Hepatitis C antibody; Future    Vitamin D 25-Hydroxy,Total (for eval of Vitamin D levels); Future    Follow Up In [facility] - PCP - Medicare Annual; Future    Vitamin D deficiency    Orders:    Follow Up In [facility] - PCP - Medicare Annual    Comprehensive Metabolic Panel; Future    Lipid Panel; Future    CBC; Future    Vitamin B12; Future    TSH with reflex to Free T4 if abnormal; Future    Hemoglobin A1C; Future    Hepatitis C antibody; Future    Vitamin D 25-Hydroxy,Total (for eval of Vitamin D levels); Future    Follow Up In [facility] - PCP - Medicare Annual; Future    Vitamin B12 deficiency    Orders:    Follow Up In [facility] - PCP - Medicare Annual    Comprehensive Metabolic Panel; Future    Lipid Panel; Future    CBC; Future    Vitamin B12; Future    TSH with reflex to Free T4 if abnormal; Future    Hemoglobin A1C; Future    Hepatitis C antibody; Future    Vitamin D 25-Hydroxy,Total (for eval of Vitamin D levels); Future    Follow Up In [facility] - PCP - Medicare Annual; Future    Mixed hyperlipidemia    Orders:    Follow Up In [facility] - PCP - Medicare Annual    atorvastatin (Lipitor) 40 mg tablet; Take 1 tablet (40 mg) by mouth once daily at bedtime.    Comprehensive Metabolic Panel; Future    Lipid Panel; Future    CBC; Future    Vitamin B12; Future    TSH with reflex to Free T4 if abnormal; Future    Hemoglobin A1C; Future    Hepatitis C antibody;  Future    Vitamin D 25-Hydroxy,Total (for eval of Vitamin D levels); Future    Follow Up In [facility] - PCP - Medicare Annual; Future    Essential hypertension    Orders:    Follow Up In [facility] - PCP - Medicare Annual    Comprehensive Metabolic Panel; Future    Lipid Panel; Future    CBC; Future    Vitamin B12; Future    TSH with reflex to Free T4 if abnormal; Future    Hemoglobin A1C; Future    Hepatitis C antibody; Future    Vitamin D 25-Hydroxy,Total (for eval of Vitamin D levels); Future    Follow Up In [facility] - PCP - Medicare Annual; Future    Prediabetes    Orders:    Follow Up In [facility] - PCP - Medicare Annual    Comprehensive Metabolic Panel; Future    Lipid Panel; Future    CBC; Future    Vitamin B12; Future    TSH with reflex to Free T4 if abnormal; Future    Hemoglobin A1C; Future    Hepatitis C antibody; Future    Vitamin D 25-Hydroxy,Total (for eval of Vitamin D levels); Future    Follow Up In [facility] - PCP - Medicare Annual; Future    Bipolar depression (Multi)    Orders:    Follow Up In [facility] - PCP - Medicare Annual    Comprehensive Metabolic Panel; Future    Lipid Panel; Future    CBC; Future    Vitamin B12; Future    TSH with reflex to Free T4 if abnormal; Future    Hemoglobin A1C; Future    Hepatitis C antibody; Future    Vitamin D 25-Hydroxy,Total (for eval of Vitamin D levels); Future    Follow Up In [facility] - PCP - Medicare Annual; Future    Chronic obstructive pulmonary disease, unspecified COPD type (Multi)    Orders:    Follow Up In [facility] - PCP - Medicare Annual    Comprehensive Metabolic Panel; Future    Lipid Panel; Future    CBC; Future    Vitamin B12; Future    TSH with reflex to Free T4 if abnormal; Future    Hemoglobin A1C; Future    Hepatitis C antibody; Future    Vitamin D 25-Hydroxy,Total (for eval of Vitamin D levels); Future    Follow Up In [facility]  PCP - Medicare Annual; Future    Lumbar pain    Orders:    cyclobenzaprine (Flexeril) 10 mg tablet; Take 1 tablet (10 mg) by mouth once daily.    Comprehensive Metabolic Panel; Future    Lipid Panel; Future    CBC; Future    Vitamin B12; Future    TSH with reflex to Free T4 if abnormal; Future    Hemoglobin A1C; Future    Hepatitis C antibody; Future    Vitamin D 25-Hydroxy,Total (for eval of Vitamin D levels); Future    Follow Up In [facility] - PCP - Medicare Annual; Future    Primary hypertension    Orders:    lisinopril 30 mg tablet; Take 1 tablet (30 mg) by mouth once daily.    Comprehensive Metabolic Panel; Future    Lipid Panel; Future    CBC; Future    Vitamin B12; Future    TSH with reflex to Free T4 if abnormal; Future    Hemoglobin A1C; Future    Hepatitis C antibody; Future    Vitamin D 25-Hydroxy,Total (for eval of Vitamin D levels); Future    Follow Up In [facility] - PCP - Medicare Annual; Future    Gastroesophageal reflux disease without esophagitis    Orders:    omeprazole (PriLOSEC) 20 mg DR capsule; Take 1 capsule (20 mg) by mouth once daily at bedtime.    Comprehensive Metabolic Panel; Future    Lipid Panel; Future    CBC; Future    Vitamin B12; Future    TSH with reflex to Free T4 if abnormal; Future    Hemoglobin A1C; Future    Hepatitis C antibody; Future    Vitamin D 25-Hydroxy,Total (for eval of Vitamin D levels); Future    Follow Up In [facility] - PCP - Medicare Annual; Future    Post-menopausal    Orders:    XR DEXA bone density; Future     New order for blood work provided at  today.      Medicare Wellness: Routine and age appropriate recommendations discussed with the patient today and patient verbalized understanding of the recommendations.  Questions answered.  Age appropriate immunizations and preventative screenings discussed with the patient and ordered as appropriate. Labs updated and ordered as indicated. Recommend healthy diet and daily  exercise to maintain healthy body weight. 5 minutes  were spent screening for depression using PHQ2/PHQ9 as documented in the chart.   Bipolar Depression, PTSD: Following with [facility] for management.   COPD: Respiratory status at baseline. Declines any need for Inhalers.   DDD: Previously followed with Chiropractor for management. Water Therapy.   Prediabetes: A1C 5.7%. Lifestyle changes recommended: Diet consisting of low fat foods, lean meats, high fiber, fresh fruits and vegetables. 150 min/ weekly aerobic exercise.   Hyperlipidemia: Continue Atorvastatin. CT Cardiac Scoring December 2022, low risk.   Hypertension: Continue Lisinopril. Blood pressure controlled at OV today. Encourage ambulatory blood pressure monitoring.   Vitamin D Deficiency: Vitamin D. Reevaluate with next lab work.   Vitamin B12 Deficiency: Vitamin B12. Reevaluate with next lab work.      Medicare Wellness: September 2024.   Bone Density: October 2022, Osteopenia. Ordered for 2024.   Mammogram: April 2022. Ordered for 2024.   Colonoscopy: May 2024, plan to repeat in 10 years.   Pneumovax: July 2021.   Flu Vaccine: August 2024.   Prevnar 20: December 2023.

## 2024-10-18 ENCOUNTER — HOSPITAL ENCOUNTER (OUTPATIENT)
Dept: RADIOLOGY | Facility: CLINIC | Age: 69
Discharge: HOME | End: 2024-10-18
Payer: COMMERCIAL

## 2024-10-18 VITALS — HEIGHT: 62 IN | BODY MASS INDEX: 29.08 KG/M2 | WEIGHT: 158 LBS

## 2024-10-18 DIAGNOSIS — Z78.0 POST-MENOPAUSAL: ICD-10-CM

## 2024-10-18 DIAGNOSIS — Z12.31 VISIT FOR SCREENING MAMMOGRAM: ICD-10-CM

## 2024-10-18 PROCEDURE — 77080 DXA BONE DENSITY AXIAL: CPT

## 2024-10-18 PROCEDURE — 77063 BREAST TOMOSYNTHESIS BI: CPT | Performed by: RADIOLOGY

## 2024-10-18 PROCEDURE — 77067 SCR MAMMO BI INCL CAD: CPT

## 2024-10-18 PROCEDURE — 77067 SCR MAMMO BI INCL CAD: CPT | Performed by: RADIOLOGY

## 2024-10-21 ENCOUNTER — TELEPHONE (OUTPATIENT)
Dept: PRIMARY CARE | Facility: CLINIC | Age: 69
End: 2024-10-21
Payer: COMMERCIAL

## 2024-10-21 DIAGNOSIS — M81.0 OSTEOPOROSIS, UNSPECIFIED OSTEOPOROSIS TYPE, UNSPECIFIED PATHOLOGICAL FRACTURE PRESENCE: Primary | ICD-10-CM

## 2024-10-21 RX ORDER — ALENDRONATE SODIUM 70 MG/1
70 TABLET ORAL
Qty: 4 TABLET | Refills: 11 | Status: SHIPPED | OUTPATIENT
Start: 2024-10-21 | End: 2025-10-21

## 2024-10-21 NOTE — TELEPHONE ENCOUNTER
----- Message from Claudette Haro sent at 10/20/2024  5:33 PM EDT -----  Please let patient know that her Bone Density has progressed to Osteoporosis. Agreeable to prescription medication?

## 2024-10-21 NOTE — TELEPHONE ENCOUNTER
----- Message from Claudette Haro sent at 10/20/2024  5:32 PM EDT -----  Please let patient know that her Mammogram is negative. Thank you!

## 2025-03-11 ENCOUNTER — APPOINTMENT (OUTPATIENT)
Dept: PRIMARY CARE | Facility: CLINIC | Age: 70
End: 2025-03-11
Payer: COMMERCIAL

## 2025-06-25 ENCOUNTER — APPOINTMENT (OUTPATIENT)
Dept: PRIMARY CARE | Facility: CLINIC | Age: 70
End: 2025-06-25
Payer: COMMERCIAL

## 2025-09-25 ENCOUNTER — APPOINTMENT (OUTPATIENT)
Dept: PRIMARY CARE | Facility: CLINIC | Age: 70
End: 2025-09-25
Payer: COMMERCIAL

## (undated) DEVICE — SPONGE, LAP, XRAY DECT, 4IN X 18IN, W/MASTER DMT, STERILE

## (undated) DEVICE — DRAPE, SHEET, MINOR PROCEDURE, T, PEDIATRIC, 100X122X77

## (undated) DEVICE — PREP, SCRUB, SKIN, FOAM, HIBICLENS, 4 OZ

## (undated) DEVICE — PAD, GROUNDING, ELECTROSURGICAL, W/9 FT CABLE, POLYHESIVE II, ADULT, LF

## (undated) DEVICE — DRESSING, GAUZE, SPONGE, VERSALON, ALL PURPOSE, 4 X 4 IN, SOFT

## (undated) DEVICE — GLOVE, SURGICAL, PROTEXIS MICRO, 7.5, PF, LATEX

## (undated) DEVICE — SUTURE, VICRYL, 2-0, 27 IN, SH, UNDYED

## (undated) DEVICE — SOLUTION, IRRIGATION, SODIUM CHLORIDE 0.9%, 1000 ML, POUR BOTTLE

## (undated) DEVICE — Device

## (undated) DEVICE — SUTURE, VICRYL, 4-0, 18 IN, PS2, UNDYED

## (undated) DEVICE — PREP TRAY, SKIN, DRY, W/GLOVES

## (undated) DEVICE — SUTURE, VICRYL, 4-0, 18 IN, UNDYED BR PS-2

## (undated) DEVICE — SUTURE, ETHILON, 4-0, BLK, MONO, PS-2 18

## (undated) DEVICE — GLOVE, PROTEXIS PI CLASSIC, SZ-7.5, PF, LF

## (undated) DEVICE — DRESSING, GAUZE, SPONGE, 12 PLY, 4 X 4 IN, PLASTIC POUCH, STRL 10PK

## (undated) DEVICE — NEEDLE, HYPODERMIC, REGULAR WALL, REGULAR BEVEL, 25 G X 1.5 IN

## (undated) DEVICE — SYRINGE, 10 CC, LUER LOCK

## (undated) DEVICE — COVER HANDLE LIGHT, STERIS, BLUE, STERILE